# Patient Record
Sex: MALE | Race: WHITE | Employment: FULL TIME | URBAN - METROPOLITAN AREA
[De-identification: names, ages, dates, MRNs, and addresses within clinical notes are randomized per-mention and may not be internally consistent; named-entity substitution may affect disease eponyms.]

---

## 2017-04-07 ENCOUNTER — OFFICE VISIT (OUTPATIENT)
Dept: FAMILY MEDICINE CLINIC | Age: 42
End: 2017-04-07

## 2017-04-07 VITALS
HEART RATE: 76 BPM | DIASTOLIC BLOOD PRESSURE: 80 MMHG | TEMPERATURE: 98.4 F | SYSTOLIC BLOOD PRESSURE: 130 MMHG | BODY MASS INDEX: 37.77 KG/M2 | RESPIRATION RATE: 22 BRPM | OXYGEN SATURATION: 94 % | HEIGHT: 73 IN | WEIGHT: 285 LBS

## 2017-04-07 DIAGNOSIS — M79.671 PAIN IN BOTH FEET: ICD-10-CM

## 2017-04-07 DIAGNOSIS — Z83.3 FAMILY HISTORY OF DIABETES MELLITUS: ICD-10-CM

## 2017-04-07 DIAGNOSIS — M54.40 CHRONIC BILATERAL LOW BACK PAIN WITH SCIATICA, SCIATICA LATERALITY UNSPECIFIED: ICD-10-CM

## 2017-04-07 DIAGNOSIS — Z00.00 ROUTINE GENERAL MEDICAL EXAMINATION AT A HEALTH CARE FACILITY: ICD-10-CM

## 2017-04-07 DIAGNOSIS — R20.0 NUMBNESS IN FEET: ICD-10-CM

## 2017-04-07 DIAGNOSIS — M79.672 PAIN IN BOTH FEET: ICD-10-CM

## 2017-04-07 DIAGNOSIS — M77.30 HEEL SPUR, UNSPECIFIED LATERALITY: ICD-10-CM

## 2017-04-07 DIAGNOSIS — F41.1 GENERALIZED ANXIETY DISORDER: Primary | ICD-10-CM

## 2017-04-07 DIAGNOSIS — N52.9 ERECTILE DYSFUNCTION, UNSPECIFIED ERECTILE DYSFUNCTION TYPE: ICD-10-CM

## 2017-04-07 DIAGNOSIS — G89.29 CHRONIC BILATERAL LOW BACK PAIN WITH SCIATICA, SCIATICA LATERALITY UNSPECIFIED: ICD-10-CM

## 2017-04-07 PROCEDURE — 99213 OFFICE O/P EST LOW 20 MIN: CPT | Performed by: NURSE PRACTITIONER

## 2017-04-07 RX ORDER — SERTRALINE HYDROCHLORIDE 100 MG/1
TABLET, FILM COATED ORAL
Qty: 30 TABLET | Refills: 5 | Status: SHIPPED | OUTPATIENT
Start: 2017-04-07 | End: 2019-01-22 | Stop reason: ALTCHOICE

## 2017-04-07 RX ORDER — SILDENAFIL 100 MG/1
100 TABLET, FILM COATED ORAL PRN
Qty: 9 TABLET | Refills: 3 | Status: SHIPPED | OUTPATIENT
Start: 2017-04-07 | End: 2019-01-22 | Stop reason: ALTCHOICE

## 2017-04-07 RX ORDER — IBUPROFEN AND FAMOTIDINE 26.6; 8 MG/1; MG/1
1 TABLET, FILM COATED ORAL 3 TIMES DAILY
Qty: 90 TABLET | Status: CANCELLED | OUTPATIENT
Start: 2017-04-07

## 2017-04-07 ASSESSMENT — PATIENT HEALTH QUESTIONNAIRE - PHQ9
SUM OF ALL RESPONSES TO PHQ QUESTIONS 1-9: 0
1. LITTLE INTEREST OR PLEASURE IN DOING THINGS: 0
2. FEELING DOWN, DEPRESSED OR HOPELESS: 0
SUM OF ALL RESPONSES TO PHQ9 QUESTIONS 1 & 2: 0

## 2017-11-06 ENCOUNTER — HOSPITAL ENCOUNTER (EMERGENCY)
Age: 42
Discharge: HOME | End: 2017-11-06
Attending: EMERGENCY MEDICINE
Payer: COMMERCIAL

## 2017-11-06 ENCOUNTER — APPOINTMENT (OUTPATIENT)
Dept: GENERAL RADIOLOGY | Age: 42
End: 2017-11-06
Payer: COMMERCIAL

## 2017-11-06 VITALS
TEMPERATURE: 97.7 F | OXYGEN SATURATION: 97 % | HEART RATE: 80 BPM | RESPIRATION RATE: 18 BRPM | BODY MASS INDEX: 39.28 KG/M2 | SYSTOLIC BLOOD PRESSURE: 151 MMHG | WEIGHT: 290 LBS | DIASTOLIC BLOOD PRESSURE: 102 MMHG | HEIGHT: 72 IN

## 2017-11-06 DIAGNOSIS — L08.9 INFECTED PUNCTURE WOUND OF PLANTAR ASPECT OF FOOT, LEFT, INITIAL ENCOUNTER: Primary | ICD-10-CM

## 2017-11-06 DIAGNOSIS — S91.332A INFECTED PUNCTURE WOUND OF PLANTAR ASPECT OF FOOT, LEFT, INITIAL ENCOUNTER: Primary | ICD-10-CM

## 2017-11-06 PROCEDURE — 73630 X-RAY EXAM OF FOOT: CPT

## 2017-11-06 PROCEDURE — 90715 TDAP VACCINE 7 YRS/> IM: CPT | Performed by: EMERGENCY MEDICINE

## 2017-11-06 PROCEDURE — 99283 EMERGENCY DEPT VISIT LOW MDM: CPT

## 2017-11-06 PROCEDURE — 90471 IMMUNIZATION ADMIN: CPT | Performed by: EMERGENCY MEDICINE

## 2017-11-06 PROCEDURE — 6370000000 HC RX 637 (ALT 250 FOR IP): Performed by: EMERGENCY MEDICINE

## 2017-11-06 PROCEDURE — 6360000002 HC RX W HCPCS: Performed by: EMERGENCY MEDICINE

## 2017-11-06 RX ORDER — GINSENG 100 MG
CAPSULE ORAL 3 TIMES DAILY
Status: DISCONTINUED | OUTPATIENT
Start: 2017-11-06 | End: 2017-11-06 | Stop reason: HOSPADM

## 2017-11-06 RX ORDER — TRAMADOL HYDROCHLORIDE 50 MG/1
100 TABLET ORAL ONCE
Status: COMPLETED | OUTPATIENT
Start: 2017-11-06 | End: 2017-11-06

## 2017-11-06 RX ORDER — IBUPROFEN 800 MG/1
800 TABLET ORAL EVERY 8 HOURS PRN
Qty: 20 TABLET | Refills: 0 | Status: SHIPPED | OUTPATIENT
Start: 2017-11-06 | End: 2019-01-22 | Stop reason: ALTCHOICE

## 2017-11-06 RX ORDER — CEPHALEXIN 500 MG/1
500 CAPSULE ORAL 4 TIMES DAILY
Qty: 28 CAPSULE | Refills: 0 | Status: SHIPPED | OUTPATIENT
Start: 2017-11-06 | End: 2017-11-13

## 2017-11-06 RX ADMIN — BACITRACIN: 500 OINTMENT TOPICAL at 14:59

## 2017-11-06 RX ADMIN — TETANUS TOXOID, REDUCED DIPHTHERIA TOXOID AND ACELLULAR PERTUSSIS VACCINE, ADSORBED 0.5 ML: 5; 2.5; 8; 8; 2.5 SUSPENSION INTRAMUSCULAR at 14:11

## 2017-11-06 RX ADMIN — TRAMADOL HYDROCHLORIDE 100 MG: 50 TABLET, COATED ORAL at 14:09

## 2017-11-06 ASSESSMENT — ENCOUNTER SYMPTOMS
SORE THROAT: 0
EYE REDNESS: 0
VOMITING: 0
DIARRHEA: 0
TROUBLE SWALLOWING: 0
VOICE CHANGE: 0
EYE DISCHARGE: 0
EYE PAIN: 0
CHEST TIGHTNESS: 0
FACIAL SWELLING: 0
CONSTIPATION: 0
WHEEZING: 0
CHOKING: 0
SINUS PRESSURE: 0
BLOOD IN STOOL: 0
STRIDOR: 0
BACK PAIN: 0
COUGH: 0
ABDOMINAL PAIN: 0
SHORTNESS OF BREATH: 0

## 2017-11-06 ASSESSMENT — PAIN SCALES - WONG BAKER: WONGBAKER_NUMERICALRESPONSE: 0

## 2017-11-06 ASSESSMENT — PAIN DESCRIPTION - DESCRIPTORS: DESCRIPTORS: SHARP

## 2017-11-06 ASSESSMENT — PAIN SCALES - GENERAL
PAINLEVEL_OUTOF10: 0
PAINLEVEL_OUTOF10: 6
PAINLEVEL_OUTOF10: 5

## 2017-11-06 ASSESSMENT — PAIN DESCRIPTION - PAIN TYPE
TYPE: ACUTE PAIN
TYPE: ACUTE PAIN

## 2017-11-06 ASSESSMENT — PAIN DESCRIPTION - LOCATION: LOCATION: FOOT

## 2017-11-06 ASSESSMENT — PAIN DESCRIPTION - ORIENTATION
ORIENTATION: LEFT
ORIENTATION: LEFT

## 2017-11-06 NOTE — ED PROVIDER NOTES
[address] [place] [address] ED  eMERGENCY dEPARTMENT eNCOUnter      Pt Name: Bonnie Hammond  MRN: [id number]  [place] 1975  Date of evaluation: 11/6/2017  Provider: Radha Morales MD    [address] [place] [address]       Chief Complaint   Patient presents with    Puncture Wound     Lt foot by nail         HISTORY OF PRESENT ILLNESS   (Location/Symptom, Timing/Onset, Context/Setting, Quality, Duration, Modifying Factors, Severity)  Note limiting factors. Bonnie Hammond is a 36 y.o. male who presents to the emergency department Patient states it is a work-related injury was working boot and despite that nail entered over the left fourth and is still intact patient not able to take the shoes off and here for further evaluation as the patient's leg pain status immunizations not sure    HPI    Nursing Notes were reviewed. REVIEW OF SYSTEMS    (2-9 systems for level 4, 10 or more for level 5)     Review of Systems   Constitutional: Negative. Negative for activity change and fever. HENT: Negative for congestion, drooling, facial swelling, mouth sores, nosebleeds, sinus pressure, sore throat, trouble swallowing and voice change. Eyes: Negative for pain, discharge, redness and visual disturbance. Respiratory: Negative for cough, choking, chest tightness, shortness of breath, wheezing and stridor. Cardiovascular: Negative for chest pain, palpitations and leg swelling. Gastrointestinal: Negative for abdominal pain, blood in stool, constipation, diarrhea and vomiting. Endocrine: Negative for cold intolerance, polyphagia and polyuria. Genitourinary: Negative for dysuria, flank pain, frequency, genital sores and urgency. Musculoskeletal: Positive for arthralgias. Negative for back pain, joint swelling, neck pain and neck stiffness. Skin: Negative for pallor and rash. Neurological: Negative for tremors, seizures, syncope, weakness, numbness and headaches. Hematological: Negative for adenopathy.  Does not bruise/bleed easily. Psychiatric/Behavioral: Negative for agitation, behavioral problems, hallucinations and sleep disturbance. The patient is not hyperactive. All other systems reviewed and are negative. Except as noted above the remainder of the review of systems was reviewed and negative. PAST MEDICAL HISTORY     Past Medical History:   Diagnosis Date    Anxiety     Depression     Erectile dysfunction     Generalized anxiety disorder          SURGICAL HISTORY       Past Surgical History:   Procedure Laterality Date    CARPAL TUNNEL RELEASE      WISDOM TOOTH EXTRACTION Bilateral          CURRENT MEDICATIONS       Discharge Medication List as of 11/6/2017  2:55 PM      CONTINUE these medications which have NOT CHANGED    Details   !! sertraline (ZOLOFT) 100 MG tablet TAKE 1 TABLET EVERY DAY, Disp-30 tablet, R-5Normal      !! sertraline (ZOLOFT) 50 MG tablet Take 1 tablet by mouth daily, Disp-30 tablet, R-5In combination with 100 mg to equal 150 mg per dayNormal      sildenafil (VIAGRA) 100 MG tablet Take 1 tablet by mouth as needed for Erectile Dysfunction, Disp-9 tablet, R-3Normal      ibuprofen-famotidine (DUEXIS) 800-26.6 MG TABS Take 1 capsule by mouth 3 times daily, Disp-90 tablet, R-5Normal       !! - Potential duplicate medications found. Please discuss with provider.           ALLERGIES     Vicodin [hydrocodone-acetaminophen]    FAMILY HISTORY       Family History   Problem Relation Age of Onset    Diabetes Other     Heart Disease Father     High Blood Pressure Father     High Cholesterol Father           SOCIAL HISTORY       Social History     Social History    Marital status:      Spouse name: N/A    Number of children: N/A    Years of education: N/A     Social History Main Topics    Smoking status: Never Smoker    Smokeless tobacco: Current User    Alcohol use Yes      Comment: occasional    Drug use: No    Sexual activity: Not Asked     Other Topics Concern    None Social History Narrative    None       SCREENINGS             PHYSICAL EXAM    (up to 7 for level 4, 8 or more for level 5)     ED Triage Vitals [11/06/17 1341]   BP Temp Temp Source Pulse Resp SpO2 Height Weight   (!) 162/96 98.4 °F (36.9 °C) Oral 87 16 97 % 6' (1.829 m) 290 lb (131.5 kg)       Physical Exam   Constitutional: He is oriented to person, place, and time. He appears well-developed and well-nourished. HENT:   Head: Normocephalic and atraumatic. Eyes: Right eye exhibits no discharge. Left eye exhibits no discharge. Neck: Neck supple. No JVD present. No tracheal deviation present. No thyromegaly present. Cardiovascular: Normal rate and normal heart sounds. Exam reveals no gallop. No murmur heard. Pulmonary/Chest: Breath sounds normal. No respiratory distress. He has no wheezes. Abdominal: Soft. Bowel sounds are normal. He exhibits no mass. There is no rebound. Musculoskeletal: Normal range of motion. He exhibits tenderness. He exhibits no edema. Attention given to the left foot patient has a work boot and the nail is intact not sure if and tenderness skin of the foot will do an x-ray   Lymphadenopathy:     He has no cervical adenopathy. Neurological: He is alert and oriented to person, place, and time. No cranial nerve deficit. He exhibits normal muscle tone. Skin: Skin is warm. No rash noted. No erythema.    Psychiatric: His behavior is normal. Thought content normal.       DIAGNOSTIC RESULTS     EKG: All EKG's are interpreted by the Emergency Department Physician who either signs or Co-signs this chart in the absence of a cardiologist.        RADIOLOGY:   Non-plain film images such as CT, Ultrasound and MRI are read by the radiologist. Plain radiographic images are visualized and preliminarily interpreted by the emergency physician with the below findings:        Interpretation per the Radiologist below, if available at the time of this note:    XR FOOT LEFT (MIN 3 VIEWS)

## 2017-11-06 NOTE — ED NOTES
Pts left foot is soaking in hibiclens and saline. Pt is tolerating well. Danyelle Quintero.  Joseph Sosa  11/06/17 4771

## 2017-11-06 NOTE — ED TRIAGE NOTES
Pt reporting he has nail in Lt foot through boot. Happened around 1330. Boots ar muddy and nail not visible at this time.

## 2019-01-22 ENCOUNTER — OFFICE VISIT (OUTPATIENT)
Dept: FAMILY MEDICINE CLINIC | Age: 44
End: 2019-01-22
Payer: COMMERCIAL

## 2019-01-22 VITALS
TEMPERATURE: 97.7 F | HEIGHT: 72 IN | SYSTOLIC BLOOD PRESSURE: 130 MMHG | DIASTOLIC BLOOD PRESSURE: 84 MMHG | WEIGHT: 308 LBS | OXYGEN SATURATION: 98 % | HEART RATE: 86 BPM | RESPIRATION RATE: 16 BRPM | BODY MASS INDEX: 41.72 KG/M2

## 2019-01-22 DIAGNOSIS — N52.9 ERECTILE DYSFUNCTION, UNSPECIFIED ERECTILE DYSFUNCTION TYPE: ICD-10-CM

## 2019-01-22 DIAGNOSIS — F33.1 MODERATE EPISODE OF RECURRENT MAJOR DEPRESSIVE DISORDER (HCC): ICD-10-CM

## 2019-01-22 DIAGNOSIS — F41.1 GENERALIZED ANXIETY DISORDER: Primary | ICD-10-CM

## 2019-01-22 DIAGNOSIS — Z00.00 ROUTINE GENERAL MEDICAL EXAMINATION AT A HEALTH CARE FACILITY: ICD-10-CM

## 2019-01-22 PROCEDURE — G8484 FLU IMMUNIZE NO ADMIN: HCPCS | Performed by: NURSE PRACTITIONER

## 2019-01-22 PROCEDURE — 99213 OFFICE O/P EST LOW 20 MIN: CPT | Performed by: NURSE PRACTITIONER

## 2019-01-22 PROCEDURE — G8417 CALC BMI ABV UP PARAM F/U: HCPCS | Performed by: NURSE PRACTITIONER

## 2019-01-22 PROCEDURE — G8427 DOCREV CUR MEDS BY ELIG CLIN: HCPCS | Performed by: NURSE PRACTITIONER

## 2019-01-22 PROCEDURE — 4004F PT TOBACCO SCREEN RCVD TLK: CPT | Performed by: NURSE PRACTITIONER

## 2019-01-22 RX ORDER — SILDENAFIL 100 MG/1
100 TABLET, FILM COATED ORAL PRN
Qty: 30 TABLET | Refills: 1 | Status: SHIPPED | OUTPATIENT
Start: 2019-01-22

## 2019-01-22 RX ORDER — ESCITALOPRAM OXALATE 10 MG/1
10 TABLET ORAL DAILY
Qty: 30 TABLET | Refills: 1 | Status: SHIPPED | OUTPATIENT
Start: 2019-01-22 | End: 2019-02-04 | Stop reason: SDUPTHER

## 2019-01-22 RX ORDER — BUSPIRONE HYDROCHLORIDE 10 MG/1
10 TABLET ORAL 2 TIMES DAILY
Qty: 60 TABLET | Refills: 0 | Status: SHIPPED | OUTPATIENT
Start: 2019-01-22 | End: 2019-02-04 | Stop reason: SDUPTHER

## 2019-01-22 ASSESSMENT — PATIENT HEALTH QUESTIONNAIRE - PHQ9
1. LITTLE INTEREST OR PLEASURE IN DOING THINGS: 1
SUM OF ALL RESPONSES TO PHQ9 QUESTIONS 1 & 2: 2
SUM OF ALL RESPONSES TO PHQ QUESTIONS 1-9: 2
2. FEELING DOWN, DEPRESSED OR HOPELESS: 1
SUM OF ALL RESPONSES TO PHQ QUESTIONS 1-9: 2

## 2019-01-31 ENCOUNTER — TELEPHONE (OUTPATIENT)
Dept: FAMILY MEDICINE CLINIC | Age: 44
End: 2019-01-31

## 2019-01-31 ENCOUNTER — HOSPITAL ENCOUNTER (OUTPATIENT)
Dept: LAB | Age: 44
Discharge: HOME | End: 2019-01-31
Payer: COMMERCIAL

## 2019-01-31 DIAGNOSIS — F41.1 GENERALIZED ANXIETY DISORDER: ICD-10-CM

## 2019-01-31 DIAGNOSIS — Z00.00 ROUTINE GENERAL MEDICAL EXAMINATION AT A HEALTH CARE FACILITY: ICD-10-CM

## 2019-01-31 DIAGNOSIS — N52.9 ERECTILE DYSFUNCTION, UNSPECIFIED ERECTILE DYSFUNCTION TYPE: ICD-10-CM

## 2019-01-31 LAB
ALBUMIN SERPL-MCNC: 4.2 G/DL (ref 3.9–4.9)
ALP BLD-CCNC: 107 U/L (ref 35–104)
ALT SERPL-CCNC: 50 U/L (ref 0–41)
ANION GAP SERPL CALCULATED.3IONS-SCNC: 11 MEQ/L (ref 7–13)
AST SERPL-CCNC: 36 U/L (ref 0–40)
BASOPHILS ABSOLUTE: 0.1 K/UL (ref 0–0.2)
BASOPHILS RELATIVE PERCENT: 1 %
BILIRUB SERPL-MCNC: <0.2 MG/DL (ref 0–1.2)
BUN BLDV-MCNC: 11 MG/DL (ref 6–20)
CALCIUM SERPL-MCNC: 9.1 MG/DL (ref 8.6–10.2)
CHLORIDE BLD-SCNC: 102 MEQ/L (ref 98–107)
CHOLESTEROL, TOTAL: 167 MG/DL (ref 0–199)
CO2: 27 MEQ/L (ref 22–29)
CREAT SERPL-MCNC: 1.18 MG/DL (ref 0.7–1.2)
EOSINOPHILS ABSOLUTE: 0.1 K/UL (ref 0–0.7)
EOSINOPHILS RELATIVE PERCENT: 1.6 %
GFR AFRICAN AMERICAN: >60
GFR NON-AFRICAN AMERICAN: >60
GLOBULIN: 3.3 G/DL (ref 2.3–3.5)
GLUCOSE BLD-MCNC: 87 MG/DL (ref 74–109)
HCT VFR BLD CALC: 46 % (ref 42–52)
HDLC SERPL-MCNC: 34 MG/DL (ref 40–59)
HEMOGLOBIN: 16 G/DL (ref 14–18)
LDL CHOLESTEROL CALCULATED: 88 MG/DL (ref 0–129)
LYMPHOCYTES ABSOLUTE: 2.5 K/UL (ref 1–4.8)
LYMPHOCYTES RELATIVE PERCENT: 27.3 %
MCH RBC QN AUTO: 29.3 PG (ref 27–31.3)
MCHC RBC AUTO-ENTMCNC: 34.8 % (ref 33–37)
MCV RBC AUTO: 84.1 FL (ref 80–100)
MONOCYTES ABSOLUTE: 0.8 K/UL (ref 0.2–0.8)
MONOCYTES RELATIVE PERCENT: 8.3 %
NEUTROPHILS ABSOLUTE: 5.6 K/UL (ref 1.4–6.5)
NEUTROPHILS RELATIVE PERCENT: 61.8 %
PDW BLD-RTO: 13.9 % (ref 11.5–14.5)
PLATELET # BLD: 309 K/UL (ref 130–400)
POTASSIUM SERPL-SCNC: 4.3 MEQ/L (ref 3.5–5.1)
PROSTATE SPECIFIC ANTIGEN: 0.88 NG/ML
RBC # BLD: 5.47 M/UL (ref 4.7–6.1)
SODIUM BLD-SCNC: 140 MEQ/L (ref 132–144)
T4 FREE: 1.28 NG/DL (ref 0.93–1.7)
TOTAL PROTEIN: 7.5 G/DL (ref 6.4–8.1)
TRIGL SERPL-MCNC: 227 MG/DL (ref 0–200)
TSH SERPL DL<=0.05 MIU/L-ACNC: 1.85 UIU/ML (ref 0.27–4.2)
VITAMIN B-12: 308 PG/ML (ref 232–1245)
WBC # BLD: 9.1 K/UL (ref 4.8–10.8)

## 2019-01-31 PROCEDURE — 80053 COMPREHEN METABOLIC PANEL: CPT

## 2019-01-31 PROCEDURE — 84153 ASSAY OF PSA TOTAL: CPT

## 2019-01-31 PROCEDURE — 36415 COLL VENOUS BLD VENIPUNCTURE: CPT

## 2019-01-31 PROCEDURE — 84439 ASSAY OF FREE THYROXINE: CPT

## 2019-01-31 PROCEDURE — 84443 ASSAY THYROID STIM HORMONE: CPT

## 2019-01-31 PROCEDURE — 84270 ASSAY OF SEX HORMONE GLOBUL: CPT

## 2019-01-31 PROCEDURE — 84403 ASSAY OF TOTAL TESTOSTERONE: CPT

## 2019-01-31 PROCEDURE — 82607 VITAMIN B-12: CPT

## 2019-01-31 PROCEDURE — 80061 LIPID PANEL: CPT

## 2019-01-31 PROCEDURE — 85025 COMPLETE CBC W/AUTO DIFF WBC: CPT

## 2019-02-02 LAB
SEX HORMONE BINDING GLOBULIN: 26 NMOL/L (ref 11–80)
TESTOSTERONE FREE PERCENT: 2 % (ref 1.6–2.9)
TESTOSTERONE FREE, CALC: 58 PG/ML (ref 47–244)
TESTOSTERONE TOTAL-MALE: 287 NG/DL (ref 300–890)

## 2019-02-04 ENCOUNTER — OFFICE VISIT (OUTPATIENT)
Dept: FAMILY MEDICINE CLINIC | Age: 44
End: 2019-02-04

## 2019-02-04 VITALS
DIASTOLIC BLOOD PRESSURE: 98 MMHG | HEIGHT: 72 IN | TEMPERATURE: 97.8 F | OXYGEN SATURATION: 97 % | BODY MASS INDEX: 41.45 KG/M2 | WEIGHT: 306 LBS | SYSTOLIC BLOOD PRESSURE: 138 MMHG | HEART RATE: 81 BPM | RESPIRATION RATE: 14 BRPM

## 2019-02-04 DIAGNOSIS — E78.1 HYPERTRIGLYCERIDEMIA: ICD-10-CM

## 2019-02-04 DIAGNOSIS — F41.1 GENERALIZED ANXIETY DISORDER: ICD-10-CM

## 2019-02-04 DIAGNOSIS — N52.9 ERECTILE DYSFUNCTION, UNSPECIFIED ERECTILE DYSFUNCTION TYPE: ICD-10-CM

## 2019-02-04 DIAGNOSIS — R79.89 LOW TESTOSTERONE IN MALE: ICD-10-CM

## 2019-02-04 DIAGNOSIS — F33.1 MODERATE EPISODE OF RECURRENT MAJOR DEPRESSIVE DISORDER (HCC): Primary | ICD-10-CM

## 2019-02-04 PROCEDURE — G8417 CALC BMI ABV UP PARAM F/U: HCPCS | Performed by: NURSE PRACTITIONER

## 2019-02-04 PROCEDURE — 99214 OFFICE O/P EST MOD 30 MIN: CPT | Performed by: NURSE PRACTITIONER

## 2019-02-04 PROCEDURE — G8427 DOCREV CUR MEDS BY ELIG CLIN: HCPCS | Performed by: NURSE PRACTITIONER

## 2019-02-04 PROCEDURE — G8484 FLU IMMUNIZE NO ADMIN: HCPCS | Performed by: NURSE PRACTITIONER

## 2019-02-04 PROCEDURE — 4004F PT TOBACCO SCREEN RCVD TLK: CPT | Performed by: NURSE PRACTITIONER

## 2019-02-04 RX ORDER — BUSPIRONE HYDROCHLORIDE 10 MG/1
10 TABLET ORAL 2 TIMES DAILY
Qty: 60 TABLET | Refills: 2 | Status: SHIPPED | OUTPATIENT
Start: 2019-02-04 | End: 2019-03-06

## 2019-02-04 RX ORDER — ESCITALOPRAM OXALATE 10 MG/1
10 TABLET ORAL DAILY
Qty: 30 TABLET | Refills: 2 | Status: SHIPPED | OUTPATIENT
Start: 2019-02-04 | End: 2021-04-29 | Stop reason: ALTCHOICE

## 2019-02-04 ASSESSMENT — PATIENT HEALTH QUESTIONNAIRE - PHQ9
SUM OF ALL RESPONSES TO PHQ9 QUESTIONS 1 & 2: 0
1. LITTLE INTEREST OR PLEASURE IN DOING THINGS: 0
2. FEELING DOWN, DEPRESSED OR HOPELESS: 0
SUM OF ALL RESPONSES TO PHQ QUESTIONS 1-9: 0
SUM OF ALL RESPONSES TO PHQ QUESTIONS 1-9: 0

## 2019-02-12 ENCOUNTER — OFFICE VISIT (OUTPATIENT)
Dept: ENDOCRINOLOGY | Age: 44
End: 2019-02-12
Payer: COMMERCIAL

## 2019-02-12 VITALS
SYSTOLIC BLOOD PRESSURE: 158 MMHG | DIASTOLIC BLOOD PRESSURE: 82 MMHG | WEIGHT: 308 LBS | BODY MASS INDEX: 41.72 KG/M2 | HEART RATE: 82 BPM | HEIGHT: 72 IN

## 2019-02-12 DIAGNOSIS — F32.9 REACTIVE DEPRESSION: ICD-10-CM

## 2019-02-12 DIAGNOSIS — E29.1 HYPOGONADISM IN MALE: Primary | ICD-10-CM

## 2019-02-12 DIAGNOSIS — N52.8 OTHER MALE ERECTILE DYSFUNCTION: ICD-10-CM

## 2019-02-12 PROCEDURE — G8417 CALC BMI ABV UP PARAM F/U: HCPCS | Performed by: PHYSICIAN ASSISTANT

## 2019-02-12 PROCEDURE — G8427 DOCREV CUR MEDS BY ELIG CLIN: HCPCS | Performed by: PHYSICIAN ASSISTANT

## 2019-02-12 PROCEDURE — G8484 FLU IMMUNIZE NO ADMIN: HCPCS | Performed by: PHYSICIAN ASSISTANT

## 2019-02-12 PROCEDURE — 99243 OFF/OP CNSLTJ NEW/EST LOW 30: CPT | Performed by: PHYSICIAN ASSISTANT

## 2019-02-12 RX ORDER — TESTOSTERONE 16.2 MG/G
2 GEL TRANSDERMAL DAILY
Qty: 1 BOTTLE | Refills: 3 | Status: SHIPPED | OUTPATIENT
Start: 2019-02-12 | End: 2019-03-26

## 2019-02-12 ASSESSMENT — ENCOUNTER SYMPTOMS
COUGH: 0
VOMITING: 0
EYE REDNESS: 0
SORE THROAT: 0
NAUSEA: 0
WHEEZING: 0
RHINORRHEA: 0
EYE PAIN: 0
ABDOMINAL PAIN: 0
DIARRHEA: 0
SINUS PRESSURE: 0
SHORTNESS OF BREATH: 0

## 2019-02-15 ENCOUNTER — TELEPHONE (OUTPATIENT)
Dept: ENDOCRINOLOGY | Age: 44
End: 2019-02-15

## 2019-02-15 DIAGNOSIS — E29.1 HYPOGONADISM IN MALE: Primary | ICD-10-CM

## 2019-02-15 RX ORDER — TESTOSTERONE GEL, 1% 10 MG/G
GEL TRANSDERMAL
Qty: 30 TUBE | Refills: 3 | Status: SHIPPED | OUTPATIENT
Start: 2019-02-15 | End: 2019-02-20 | Stop reason: SDUPTHER

## 2019-02-20 DIAGNOSIS — E29.1 HYPOGONADISM IN MALE: ICD-10-CM

## 2019-02-20 RX ORDER — TESTOSTERONE 50 MG/5G
GEL TRANSDERMAL
Qty: 90 TUBE | Refills: 1 | Status: SHIPPED | OUTPATIENT
Start: 2019-02-20 | End: 2019-03-26 | Stop reason: SDUPTHER

## 2019-03-26 ENCOUNTER — OFFICE VISIT (OUTPATIENT)
Dept: ENDOCRINOLOGY | Age: 44
End: 2019-03-26
Payer: COMMERCIAL

## 2019-03-26 ENCOUNTER — HOSPITAL ENCOUNTER (OUTPATIENT)
Dept: LAB | Age: 44
Discharge: HOME | End: 2019-03-26
Payer: COMMERCIAL

## 2019-03-26 VITALS
SYSTOLIC BLOOD PRESSURE: 138 MMHG | DIASTOLIC BLOOD PRESSURE: 86 MMHG | HEART RATE: 83 BPM | HEIGHT: 72 IN | BODY MASS INDEX: 42.66 KG/M2 | WEIGHT: 315 LBS

## 2019-03-26 DIAGNOSIS — E29.1 HYPOGONADISM IN MALE: ICD-10-CM

## 2019-03-26 LAB
HCT VFR BLD CALC: 47.4 % (ref 42–52)
HEMOGLOBIN: 15.4 G/DL (ref 14–18)
MCH RBC QN AUTO: 28.4 PG (ref 27–31.3)
MCHC RBC AUTO-ENTMCNC: 32.6 % (ref 33–37)
MCV RBC AUTO: 87.1 FL (ref 80–100)
PDW BLD-RTO: 14.5 % (ref 11.5–14.5)
PLATELET # BLD: 271 K/UL (ref 130–400)
RBC # BLD: 5.44 M/UL (ref 4.7–6.1)
WBC # BLD: 5.7 K/UL (ref 4.8–10.8)

## 2019-03-26 PROCEDURE — 4004F PT TOBACCO SCREEN RCVD TLK: CPT | Performed by: PHYSICIAN ASSISTANT

## 2019-03-26 PROCEDURE — G8417 CALC BMI ABV UP PARAM F/U: HCPCS | Performed by: PHYSICIAN ASSISTANT

## 2019-03-26 PROCEDURE — 36415 COLL VENOUS BLD VENIPUNCTURE: CPT

## 2019-03-26 PROCEDURE — 84270 ASSAY OF SEX HORMONE GLOBUL: CPT

## 2019-03-26 PROCEDURE — 99214 OFFICE O/P EST MOD 30 MIN: CPT | Performed by: PHYSICIAN ASSISTANT

## 2019-03-26 PROCEDURE — G8427 DOCREV CUR MEDS BY ELIG CLIN: HCPCS | Performed by: PHYSICIAN ASSISTANT

## 2019-03-26 PROCEDURE — 85027 COMPLETE CBC AUTOMATED: CPT

## 2019-03-26 PROCEDURE — G8484 FLU IMMUNIZE NO ADMIN: HCPCS | Performed by: PHYSICIAN ASSISTANT

## 2019-03-26 PROCEDURE — 84403 ASSAY OF TOTAL TESTOSTERONE: CPT

## 2019-03-26 RX ORDER — TESTOSTERONE 50 MG/5G
GEL TRANSDERMAL
Qty: 90 TUBE | Refills: 1 | Status: SHIPPED | OUTPATIENT
Start: 2019-03-26 | End: 2019-05-23 | Stop reason: SDUPTHER

## 2019-03-26 ASSESSMENT — ENCOUNTER SYMPTOMS
EYE PAIN: 0
RHINORRHEA: 0
EYE REDNESS: 0
SHORTNESS OF BREATH: 0
ABDOMINAL PAIN: 0
NAUSEA: 0
VOMITING: 0
SORE THROAT: 0
SINUS PRESSURE: 0
WHEEZING: 0
COUGH: 0
DIARRHEA: 0

## 2019-03-29 LAB
SEX HORMONE BINDING GLOBULIN: 31 NMOL/L (ref 11–80)
TESTOSTERONE FREE PERCENT: 1.8 % (ref 1.6–2.9)
TESTOSTERONE FREE, CALC: 24 PG/ML (ref 47–244)
TESTOSTERONE TOTAL-MALE: 138 NG/DL (ref 300–890)

## 2019-05-23 ENCOUNTER — OFFICE VISIT (OUTPATIENT)
Dept: ENDOCRINOLOGY | Age: 44
End: 2019-05-23
Payer: COMMERCIAL

## 2019-05-23 VITALS
WEIGHT: 310 LBS | HEART RATE: 78 BPM | HEIGHT: 72 IN | BODY MASS INDEX: 41.99 KG/M2 | DIASTOLIC BLOOD PRESSURE: 87 MMHG | SYSTOLIC BLOOD PRESSURE: 131 MMHG

## 2019-05-23 DIAGNOSIS — E29.1 HYPOGONADISM IN MALE: Primary | ICD-10-CM

## 2019-05-23 PROCEDURE — 99214 OFFICE O/P EST MOD 30 MIN: CPT | Performed by: PHYSICIAN ASSISTANT

## 2019-05-23 PROCEDURE — G8417 CALC BMI ABV UP PARAM F/U: HCPCS | Performed by: PHYSICIAN ASSISTANT

## 2019-05-23 PROCEDURE — 4004F PT TOBACCO SCREEN RCVD TLK: CPT | Performed by: PHYSICIAN ASSISTANT

## 2019-05-23 PROCEDURE — G8427 DOCREV CUR MEDS BY ELIG CLIN: HCPCS | Performed by: PHYSICIAN ASSISTANT

## 2019-05-23 RX ORDER — TESTOSTERONE 50 MG/5G
GEL TRANSDERMAL
Qty: 180 TUBE | Refills: 1 | Status: SHIPPED | OUTPATIENT
Start: 2019-05-23 | End: 2022-01-31 | Stop reason: ALTCHOICE

## 2019-05-23 ASSESSMENT — ENCOUNTER SYMPTOMS
SHORTNESS OF BREATH: 0
NAUSEA: 0
SINUS PRESSURE: 0
DIARRHEA: 0
EYE REDNESS: 0
RHINORRHEA: 0
VOMITING: 0
SORE THROAT: 0
COUGH: 0
WHEEZING: 0
EYE PAIN: 0
ABDOMINAL PAIN: 0

## 2019-05-23 NOTE — PROGRESS NOTES
dealing with recent emotional stressors due to a breakup and is currently prescribed antidepressant medications. He denies a history of head trauma, genitalia or pelvic trauma or surgeries, steroid use, dysuria, or STDs. He is a physically active man, works 10-12 hours a day however states that he sleeps a lot when he gets home due to severe fatigue. His total testosterone level was low at 287. Libido has improved and his energy has improved. Continue with current treatment plan. Past Medical History:   Diagnosis Date    Anxiety     Depression     Erectile dysfunction     Generalized anxiety disorder     Hypertriglyceridemia 2/4/2019       Current Outpatient Medications:     TESTIM 50 MG/5GM (1%) GEL 1% gel, Apply 2 packet daily, Disp: 180 Tube, Rfl: 1    escitalopram (LEXAPRO) 10 MG tablet, Take 1 tablet by mouth daily, Disp: 30 tablet, Rfl: 2    sildenafil (VIAGRA) 100 MG tablet, Take 1 tablet by mouth as needed for Erectile Dysfunction, Disp: 30 tablet, Rfl: 1        Lab Results   Component Value Date     01/31/2019    K 4.3 01/31/2019     01/31/2019    CO2 27 01/31/2019    BUN 11 01/31/2019    CREATININE 1.18 01/31/2019    GLUCOSE 87 01/31/2019    CALCIUM 9.1 01/31/2019    PROT 7.5 01/31/2019    LABALBU 4.2 01/31/2019    BILITOT <0.2 01/31/2019    ALKPHOS 107 (H) 01/31/2019    AST 36 01/31/2019    ALT 50 (H) 01/31/2019    LABGLOM >60.0 01/31/2019    GFRAA >60.0 01/31/2019    GLOB 3.3 01/31/2019     Lab Results   Component Value Date    WBC 5.7 03/26/2019    HGB 15.4 03/26/2019    HCT 47.4 03/26/2019    MCV 87.1 03/26/2019     03/26/2019     Results for Calos Dee (MRN [id number]) as of 2/12/2019 10:08   Ref.  Range 3/17/2016 11:48 1/31/2019 15:35   Total Testosterone Latest Ref Range: 300 - 890 ng/dL 195 (L) 287 (L)     No results found for: LABA1C  Lab Results   Component Value Date    CHOL 167 01/31/2019     Lab Results   Component Value Date    TRIG 227 (H) 01/31/2019 Lab Results   Component Value Date    HDL 34 (L) 01/31/2019     Lab Results   Component Value Date    LDLCALC 88 01/31/2019     No results found for: LABVLDL, VLDL  No results found for: CHOLHDLRATIO  No results found for: TESTOSTERONE  Lab Results   Component Value Date    TSH 1.850 01/31/2019           Allergies   Allergen Reactions    Vicodin [Hydrocodone-Acetaminophen] Nausea Only     sick       Past Surgical History:   Procedure Laterality Date    CARPAL TUNNEL RELEASE      WISDOM TOOTH EXTRACTION Bilateral      Social History     Socioeconomic History    Marital status:      Spouse name: Not on file    Number of children: Not on file    Years of education: Not on file    Highest education level: Not on file   Occupational History    Not on file   Social Needs    Financial resource strain: Not on file    Food insecurity:     Worry: Not on file     Inability: Not on file    Transportation needs:     Medical: Not on file     Non-medical: Not on file   Tobacco Use    Smoking status: Never Smoker    Smokeless tobacco: Current User   Substance and Sexual Activity    Alcohol use: Yes     Comment: occasional    Drug use: No    Sexual activity: Not on file   Lifestyle    Physical activity:     Days per week: Not on file     Minutes per session: Not on file    Stress: Not on file   Relationships    Social connections:     Talks on phone: Not on file     Gets together: Not on file     Attends Hinduism service: Not on file     Active member of club or organization: Not on file     Attends meetings of clubs or organizations: Not on file     Relationship status: Not on file    Intimate partner violence:     Fear of current or ex partner: Not on file     Emotionally abused: Not on file     Physically abused: Not on file     Forced sexual activity: Not on file   Other Topics Concern    Not on file   Social History Narrative    Not on file     Family History   Problem Relation Age of Onset  Diabetes Other     Heart Disease Father     High Blood Pressure Father     High Cholesterol Father          Review of Systems   Constitutional: Negative for chills, fatigue and fever. HENT: Negative for congestion, ear pain, postnasal drip, rhinorrhea, sinus pressure and sore throat. Eyes: Negative for pain and redness. Respiratory: Negative for cough, shortness of breath and wheezing. Cardiovascular: Negative for chest pain, palpitations and leg swelling. Gastrointestinal: Negative for abdominal pain, diarrhea, nausea and vomiting. Endocrine: Negative for cold intolerance, heat intolerance and polyuria. Genitourinary: Negative for decreased urine volume, difficulty urinating, discharge, dysuria, flank pain, genital sores, hematuria, penile pain, penile swelling, scrotal swelling, testicular pain and urgency. Musculoskeletal: Negative for arthralgias. Skin: Negative for rash. Allergic/Immunologic: Negative for environmental allergies. Neurological: Negative for dizziness and headaches. Hematological: Negative for adenopathy. Psychiatric/Behavioral: Negative for agitation and confusion. The patient is nervous/anxious. Dysphoric mood        Objective:   Physical Exam   Constitutional: He is oriented to person, place, and time. He appears well-developed and well-nourished. HENT:   Head: Normocephalic and atraumatic. Eyes: Conjunctivae and EOM are normal.   Neck: Normal range of motion. Neck supple. No thyromegaly present. Cardiovascular: Normal rate, regular rhythm and normal heart sounds. Pulmonary/Chest: Effort normal and breath sounds normal.   Abdominal: Soft. Bowel sounds are normal. He exhibits no distension. There is no tenderness. A hernia is present. Hernia confirmed positive in the left inguinal area. Hernia confirmed negative in the right inguinal area. Genitourinary: Penis normal. Right testis shows no mass, no swelling and no tenderness.  Right testis is descended. Cremasteric reflex is absent on the right side. Left testis shows no mass, no swelling and no tenderness. Left testis is descended. Cremasteric reflex is not absent on the left side. Circumcised. No penile erythema or penile tenderness. No discharge found. Musculoskeletal: Normal range of motion. Lymphadenopathy:     He has no cervical adenopathy. No inguinal adenopathy noted on the right or left side. Neurological: He is alert and oriented to person, place, and time. Skin: Skin is warm and dry. Vitals reviewed.

## 2021-03-29 ENCOUNTER — OFFICE VISIT (OUTPATIENT)
Dept: FAMILY MEDICINE CLINIC | Age: 46
End: 2021-03-29
Payer: COMMERCIAL

## 2021-03-29 ENCOUNTER — NURSE TRIAGE (OUTPATIENT)
Dept: OTHER | Facility: CLINIC | Age: 46
End: 2021-03-29

## 2021-03-29 VITALS
HEIGHT: 72 IN | WEIGHT: 315 LBS | BODY MASS INDEX: 42.66 KG/M2 | SYSTOLIC BLOOD PRESSURE: 122 MMHG | RESPIRATION RATE: 17 BRPM | HEART RATE: 78 BPM | TEMPERATURE: 97.8 F | OXYGEN SATURATION: 97 % | DIASTOLIC BLOOD PRESSURE: 80 MMHG

## 2021-03-29 DIAGNOSIS — G25.81 RESTLESS LEGS: ICD-10-CM

## 2021-03-29 DIAGNOSIS — R60.9 DEPENDENT EDEMA: Primary | ICD-10-CM

## 2021-03-29 PROCEDURE — 99213 OFFICE O/P EST LOW 20 MIN: CPT | Performed by: NURSE PRACTITIONER

## 2021-03-29 SDOH — ECONOMIC STABILITY: TRANSPORTATION INSECURITY
IN THE PAST 12 MONTHS, HAS LACK OF TRANSPORTATION KEPT YOU FROM MEETINGS, WORK, OR FROM GETTING THINGS NEEDED FOR DAILY LIVING?: NO

## 2021-03-29 SDOH — ECONOMIC STABILITY: FOOD INSECURITY: WITHIN THE PAST 12 MONTHS, THE FOOD YOU BOUGHT JUST DIDN'T LAST AND YOU DIDN'T HAVE MONEY TO GET MORE.: NEVER TRUE

## 2021-03-29 ASSESSMENT — ENCOUNTER SYMPTOMS
COUGH: 0
DIARRHEA: 0
VOMITING: 0
ABDOMINAL PAIN: 0
NAUSEA: 0

## 2021-03-29 NOTE — PATIENT INSTRUCTIONS
medicine. · Whenever you are resting, raise your legs up. Try to keep the swollen area higher than the level of your heart. · Take breaks from standing or sitting in one position. ? Walk around to increase the blood flow in your lower legs. ? Move your feet and ankles often while you stand, or tighten and relax your leg muscles. · Wear support stockings. Put them on in the morning, before swelling gets worse. · Eat a balanced diet. Lose weight if you need to. · Limit the amount of salt (sodium) in your diet. Salt holds fluid in the body and may increase swelling. When should you call for help? Call 911 anytime you think you may need emergency care. For example, call if:    · You have symptoms of a blood clot in your lung (called a pulmonary embolism). These may include:  ? Sudden chest pain. ? Trouble breathing. ? Coughing up blood. Call your doctor now or seek immediate medical care if:    · You have signs of a blood clot, such as:  ? Pain in your calf, back of the knee, thigh, or groin. ? Redness and swelling in your leg or groin.     · You have symptoms of infection, such as:  ? Increased pain, swelling, warmth, or redness. ? Red streaks or pus. ? A fever. Watch closely for changes in your health, and be sure to contact your doctor if:    · Your swelling is getting worse.     · You have new or worsening pain in your legs.     · You do not get better as expected. Where can you learn more? Go to https://[email or web address]. org and sign in to your [employer] account. Enter H347 in the [facility] box to learn more about \"Leg and Ankle Edema: Care Instructions. \"     If you do not have an account, please click on the \"Sign Up Now\" link. Current as of: February 26, 2020               Content Version: 12.8  © 0752-9394 [employer]wise, Incorporated. Care instructions adapted under license by [facility] [place] ([facility]).  If you have questions about a medical condition or this instruction, always ask your healthcare professional. [place] [address] any warranty or liability for your use of this information. Patient Education   Learning About Using Compression Stockings  What are compression stockings? Compression stockings may be used for problems like varicose veins, skin ulcers, and deep vein thrombosis. But there are different types of stockings, and they need to fit right. So your doctor will recommend what you need. These stockings are the most common treatment for varicose veins. They help the blood circulate in your legs. This can prevent skin ulcers and keep blood from building up in the legs. Prescription stockings are tightest at the foot. They get less and less tight farther up on your legs. The kind you buy without a prescription have lighter elastic. So the pressure is even all the way up the leg. These don't cost as much. But they don't provide the compression you need to treat serious symptoms or prevent skin ulcers. How do you use compression stockings? · If your stockings are new, you may want to wash them before you put them on. This can make them more flexible and easier to put on. It's a good idea to wash them by hand. · Most of the time it's best to put on your stockings early in the morning. This is when you have the least swelling in your legs. · Put silicone lotion (such as ALPS) or talcum powder on your legs to help the stockings slide on. If your stockings contain latex, or you aren't sure if they contain latex, do not use other types of lotions or creams on your legs when you wear the stockings. You may use other lotions or creams when you are not wearing the stockings. · Sit in a chair with a back while you put on the stockings. This gives you something to lean against as you pull them up. · How to put on stockings:  ? Turn your stocking inside out.  Then put your toe in as far as it will go.  ? Readjust the stocking by folding it back onto itself at

## 2021-03-29 NOTE — TELEPHONE ENCOUNTER
Received call from Daiana Sweet at [facility] [employer] Complaint. Brief description of triage: bilateral lower leg swelling, started three years ago    Triage indicates for patient to be seen today in office. Care advice provided, patient verbalizes understanding; denies any other questions or concerns; instructed to call back for any new or worsening symptoms. Writer provided warm transfer to [facility] for appointment scheduling. Attention Provider: Thank you for allowing me to participate in the care of your patient. The patient was connected to triage in response to information provided to the [facility]. Please do not respond through this encounter as the response is not directed to a shared pool. Reason for Disposition   MODERATE swelling of both ankles (e.g., swelling extends up to the knees) AND new onset or worsening    Answer Assessment - Initial Assessment Questions  1. ONSET: \"When did the swelling start? \" (e.g., minutes, hours, days)      Three years ago    2. LOCATION: \"What part of the leg is swollen? \"  \"Are both legs swollen or just one leg? \"      Bilateral lower leg    3. SEVERITY: \"How bad is the swelling? \" (e.g., localized; mild, moderate, severe)   - Localized - small area of swelling localized to one leg   - MILD pedal edema - swelling limited to foot and ankle, pitting edema < 1/4 inch (6 mm) deep, rest and elevation eliminate most or all swelling   - MODERATE edema - swelling of lower leg to knee, pitting edema > 1/4 inch (6 mm) deep, rest and elevation only partially reduce swelling   - SEVERE edema - swelling extends above knee, facial or hand swelling present       Mild    4. REDNESS: \"Does the swelling look red or infected? \"      No redness but reports \"bruising\" to inside of ankles    5. PAIN: \"Is the swelling painful to touch? \" If so, ask: \"How painful is it? \"   (Scale 1-10; mild, moderate or severe)      No pain, but wakes up at night with throbbing and

## 2021-03-29 NOTE — PROGRESS NOTES
expected course around dependent edema in the context of prolonged sitting/ sedentary lifestyle and we discussed conservative supportive care measures that he could try. Diagnoses and all orders for this visit:    ICD-10-CM    1. Dependent edema  R60.9 CBC With Auto Differential     Comprehensive Metabolic Panel     Hemoglobin A1C   2. Restless legs  G25.81      No orders of the defined types were placed in this encounter. 1. Try compression stockings 15 mmHg 3d/week to begin  2. Moderate exercise- avoid exercising late in the day with respect to restless leg sxs  OTC analgesics prn  3. Cut back on alcohol, caffeine if applicable  4. Apply warm or cool packs to your legs, which can help relieve sensations  5. Try to go to bed at the same time every night and wake up at the same time every morning    Return for follow-up with PCP next month as planned- return to walk-in as needed. Side effects and adverse effects of any medication prescribed today, as well as treatment plan/rationale, follow-up care, and result expectations have been discussed with the patient. Temo Flores expresses understanding and wishes to proceed with the plan. Discussed signs and symptoms which require immediate follow-up in ED/call to 911. Understanding verbalized. I have reviewed and updated the electronic medical record.     Karthik Crisostomo, APRN - CNP

## 2021-04-02 ENCOUNTER — HOSPITAL ENCOUNTER (OUTPATIENT)
Dept: LAB | Age: 46
Discharge: HOME | End: 2021-04-02
Payer: COMMERCIAL

## 2021-04-02 DIAGNOSIS — R60.9 DEPENDENT EDEMA: ICD-10-CM

## 2021-04-02 LAB
ALBUMIN SERPL-MCNC: 4.4 G/DL (ref 3.5–4.6)
ALP BLD-CCNC: 97 U/L (ref 35–104)
ALT SERPL-CCNC: 53 U/L (ref 0–41)
ANION GAP SERPL CALCULATED.3IONS-SCNC: 9 MEQ/L (ref 9–15)
AST SERPL-CCNC: 44 U/L (ref 0–40)
BASOPHILS ABSOLUTE: 0 K/UL (ref 0–0.2)
BASOPHILS RELATIVE PERCENT: 0.5 %
BILIRUB SERPL-MCNC: 0.6 MG/DL (ref 0.2–0.7)
BUN BLDV-MCNC: 13 MG/DL (ref 6–20)
CALCIUM SERPL-MCNC: 9.6 MG/DL (ref 8.5–9.9)
CHLORIDE BLD-SCNC: 102 MEQ/L (ref 95–107)
CO2: 27 MEQ/L (ref 20–31)
CREAT SERPL-MCNC: 1.21 MG/DL (ref 0.7–1.2)
EOSINOPHILS ABSOLUTE: 0.1 K/UL (ref 0–0.7)
EOSINOPHILS RELATIVE PERCENT: 2.2 %
GFR AFRICAN AMERICAN: >60
GFR NON-AFRICAN AMERICAN: >60
GLOBULIN: 3.1 G/DL (ref 2.3–3.5)
GLUCOSE BLD-MCNC: 103 MG/DL (ref 70–99)
HBA1C MFR BLD: 5.8 % (ref 4.8–5.9)
HCT VFR BLD CALC: 48.6 % (ref 42–52)
HEMOGLOBIN: 16.1 G/DL (ref 14–18)
LYMPHOCYTES ABSOLUTE: 1.9 K/UL (ref 1–4.8)
LYMPHOCYTES RELATIVE PERCENT: 30.1 %
MCH RBC QN AUTO: 28 PG (ref 27–31.3)
MCHC RBC AUTO-ENTMCNC: 33 % (ref 33–37)
MCV RBC AUTO: 84.8 FL (ref 80–100)
MONOCYTES ABSOLUTE: 0.7 K/UL (ref 0.2–0.8)
MONOCYTES RELATIVE PERCENT: 10.4 %
NEUTROPHILS ABSOLUTE: 3.6 K/UL (ref 1.4–6.5)
NEUTROPHILS RELATIVE PERCENT: 56.8 %
PDW BLD-RTO: 15 % (ref 11.5–14.5)
PLATELET # BLD: 288 K/UL (ref 130–400)
POTASSIUM SERPL-SCNC: 4.8 MEQ/L (ref 3.4–4.9)
RBC # BLD: 5.73 M/UL (ref 4.7–6.1)
SODIUM BLD-SCNC: 138 MEQ/L (ref 135–144)
TOTAL PROTEIN: 7.5 G/DL (ref 6.3–8)
WBC # BLD: 6.3 K/UL (ref 4.8–10.8)

## 2021-04-02 PROCEDURE — 83036 HEMOGLOBIN GLYCOSYLATED A1C: CPT

## 2021-04-02 PROCEDURE — 80053 COMPREHEN METABOLIC PANEL: CPT

## 2021-04-02 PROCEDURE — 36415 COLL VENOUS BLD VENIPUNCTURE: CPT

## 2021-04-02 PROCEDURE — 85025 COMPLETE CBC W/AUTO DIFF WBC: CPT

## 2021-04-18 ASSESSMENT — ENCOUNTER SYMPTOMS
CHEST TIGHTNESS: 0
BACK PAIN: 0
BLOOD IN STOOL: 0
COLOR CHANGE: 0
SHORTNESS OF BREATH: 0

## 2021-04-29 ENCOUNTER — OFFICE VISIT (OUTPATIENT)
Dept: FAMILY MEDICINE CLINIC | Age: 46
End: 2021-04-29
Payer: COMMERCIAL

## 2021-04-29 VITALS
BODY MASS INDEX: 42.53 KG/M2 | WEIGHT: 314 LBS | HEART RATE: 78 BPM | SYSTOLIC BLOOD PRESSURE: 110 MMHG | HEIGHT: 72 IN | RESPIRATION RATE: 18 BRPM | TEMPERATURE: 97.6 F | OXYGEN SATURATION: 95 % | DIASTOLIC BLOOD PRESSURE: 68 MMHG

## 2021-04-29 DIAGNOSIS — R53.83 OTHER FATIGUE: ICD-10-CM

## 2021-04-29 DIAGNOSIS — E78.1 HYPERTRIGLYCERIDEMIA: ICD-10-CM

## 2021-04-29 DIAGNOSIS — R74.8 ELEVATED LIVER ENZYMES: ICD-10-CM

## 2021-04-29 DIAGNOSIS — E16.2 NONDIABETIC HYPOGLYCEMIA: ICD-10-CM

## 2021-04-29 DIAGNOSIS — M54.50 CHRONIC BILATERAL LOW BACK PAIN WITHOUT SCIATICA: ICD-10-CM

## 2021-04-29 DIAGNOSIS — G89.29 CHRONIC BILATERAL LOW BACK PAIN WITHOUT SCIATICA: ICD-10-CM

## 2021-04-29 DIAGNOSIS — R60.0 BILATERAL EDEMA OF LOWER EXTREMITY: Primary | ICD-10-CM

## 2021-04-29 DIAGNOSIS — F33.1 MODERATE EPISODE OF RECURRENT MAJOR DEPRESSIVE DISORDER (HCC): ICD-10-CM

## 2021-04-29 DIAGNOSIS — R55 SYNCOPE, UNSPECIFIED SYNCOPE TYPE: ICD-10-CM

## 2021-04-29 PROCEDURE — 99214 OFFICE O/P EST MOD 30 MIN: CPT | Performed by: NURSE PRACTITIONER

## 2021-04-29 RX ORDER — IBUPROFEN 800 MG/1
800 TABLET ORAL EVERY 6 HOURS PRN
COMMUNITY
End: 2021-04-29 | Stop reason: SDUPTHER

## 2021-04-29 RX ORDER — HYDROCHLOROTHIAZIDE 25 MG/1
25 TABLET ORAL EVERY MORNING
Qty: 90 TABLET | Refills: 1 | Status: SHIPPED | OUTPATIENT
Start: 2021-04-29 | End: 2022-01-09

## 2021-04-29 RX ORDER — BUPROPION HYDROCHLORIDE 150 MG/1
150 TABLET ORAL EVERY MORNING
Qty: 90 TABLET | Refills: 1 | Status: SHIPPED | OUTPATIENT
Start: 2021-04-29 | End: 2022-01-09

## 2021-04-29 RX ORDER — IBUPROFEN 800 MG/1
800 TABLET ORAL EVERY 6 HOURS PRN
Qty: 300 TABLET | Refills: 1 | Status: SHIPPED | OUTPATIENT
Start: 2021-04-29 | End: 2021-07-01

## 2021-04-29 ASSESSMENT — PATIENT HEALTH QUESTIONNAIRE - PHQ9: 1. LITTLE INTEREST OR PLEASURE IN DOING THINGS: 0

## 2021-04-29 NOTE — PROGRESS NOTES
Chief Complaint   Patient presents with    Leg Swelling     patient has been experiencing some leg and feet swelling for few years     Results     patient is following up on lab results. HPI: Lorraine Yang is a 54 y.o. male presenting for follow-up of swelling of the lower extremities. Lower extremity edema: He states that symptoms initially started about 3 years ago. He has noticed that the swelling does get better when he lays down at night. He has not had any issues with his breathing. No shortness of breath or coughing when lying down at night. He has not noticed any varicose veins but has noticed some darkening of his skin to his lower legs and some darker veins near the base of his feet. He denies any redness or swelling of the legs. No pain reported. He was recently seen in [facility] care for this issue. States that he was requesting water pill but given the chronicity of symptoms he was advised to follow-up with me. Chronic low back pain: He states that he has had ongoing issues with back pain for quite some time now. Sits for many hours continuously for his job. He denies any pain going down either leg but admits to having to stretch often to help take pressure off of his back. History of low sugar episodes: He states that if he does not eat for longer periods of time he tends to get shaky and sweaty and feel bad overall. He states that after eating something he quickly feels better. He does have a strong family history of diabetes. He would like to review recent blood work that was done to see if he is a diabetic. Depression: He states overall his mood has been okay. He has not felt sad or anxious but states that he has been lacking motivation and energy a lot lately. He has tried multiple medications for depression that seemed to have side effects. The last when he took was Lexapro and this contributed to insomnia and restless leg symptoms.   He would like to try some type of antidepressant that might be more energizing. He denies any thoughts of self-harm or harm to others. Syncope: occurred last summer. He states that he had been working long hours sitting continuously and when he lifted his arms to stretch he ended up passing out. He woke up later on the ground states that he hit his head and his hip. This is the only time of this ever happened. He states that a couple days prior to this he had an issue with seeing specks in his vision before this happened. He states that he was not really drinking water back then. He does not recall having felt any heart palpitations or even lightheadedness or dizziness at this time. He has not had any episodes since that time. He states that he was not evaluated when this happened. Review of systems: This patient reports no chest pain or pressure. There is no shortness of breath or cough. The patient reports no nausea or vomiting. There is no heartburn or indigestion. There is no diarrhea or constipation. No black, bloody, mucusy or tarry stool noticed. The patient reports no bloating and no change in appetite. I have reviewed the following diagnostic data: recent blood test results. EXAM:  Constitutional Blood pressure 110/68, pulse 78, temperature 97.6 °F (36.4 °C), temperature source Temporal, resp. rate 18, height 6' (1.829 m), weight (!) 314 lb (142.4 kg), SpO2 95 %. .  He has a normal affect, no acute distress, appears well developed and well nourished. Neck:  neck- supple, no mass, non-tender and no bruits  Lungs:  Normal expansion. Clear to auscultation. No rales, rhonchi, or wheezing., No chest wall tenderness. Heart:  Heart sounds are normal.  Regular rate and rhythm without murmur, gallop or rub. Abdomen:  Soft, non-tender, normal bowel sounds. No bruits, organomegaly or masses. Extremities: pulses present in all extremities and trace pedal edema  Spider veins medial ankles.   Mild hemosiderin staining to the inner ankle area as well. DIAGNOSIS:    Diagnosis Orders   1. Bilateral edema of lower extremity  hydroCHLOROthiazide (HYDRODIURIL) 25 MG tablet    XR CHEST (2 VW)   2. Hypertriglyceridemia  Comprehensive Metabolic Panel    Lipid Panel   3. Nondiabetic hypoglycemia  Insulin, Total   4. Chronic bilateral low back pain without sciatica  ibuprofen (ADVIL;MOTRIN) 800 MG tablet    XR LUMBAR SPINE (2-3 VIEWS)   5. Moderate episode of recurrent major depressive disorder (HCC)  buPROPion (WELLBUTRIN XL) 150 MG extended release tablet   6. Other fatigue  TSH without Reflex    T4, Free    Vitamin D 25 Hydroxy    Vitamin B12 & Folate   7. Syncope, unspecified syncope type     8. Elevated liver enzymes         PLAN: Include orders in the DX section. Follow up: 6 weeks and as needed. Blood work one week prior as ordered. 1.  Discussed option of starting low-dose hydrochlorothiazide for chronic swelling of the lower extremities. I do also recommend getting additional blood work and orders given. Recommend chest x-ray as well. We will recheck electrolytes after being on the medication for at least a couple of weeks. 2.  We will plan to get fasting blood work done as well for history of elevated triglycerides. 3.  We will plan to check insulin level with next lab. He was not fasting with most recent blood work and hemoglobin A1c was within normal range. 4.  Recommend baseline x-ray of the lumbar spine. Refill of ibuprofen given. 5.  6.  We will check vitamin levels as well as thyroid and start Wellbutrin once daily. We will plan to follow-up next month at appointment. 7.  He has not had symptoms in nearly a year. Discussed that he may have been dehydrated or have had electrolyte balance at that time. Given the issue with chronic lower extremity edema, you may want to initiate cardiac work-up in the future. He verbalizes understanding. He will notify me if symptoms return.     8.  Discussed elevated liver enzymes with most recent blood work. He states that he has actually been eating healthier and drinking less alcohol than he was in the past.  If levels are still elevated with recheck will recommend ultrasound of the liver. Please note this report has been partially produced using speech recognition software and may cause contain errors related to that system including grammar, punctuation and spelling as well as words and phrases that may seem inappropriate. If there are questions or concerns please feel free to contact me to clarify.         Electronically signed by Azra BLANKOGJENNY, 2:15 PM 4/29/21

## 2021-05-18 ENCOUNTER — HOSPITAL ENCOUNTER (OUTPATIENT)
Dept: LAB | Age: 46
Discharge: HOME | End: 2021-05-18
Payer: COMMERCIAL

## 2021-05-18 DIAGNOSIS — R53.83 OTHER FATIGUE: ICD-10-CM

## 2021-05-18 DIAGNOSIS — E78.1 HYPERTRIGLYCERIDEMIA: ICD-10-CM

## 2021-05-18 DIAGNOSIS — E16.2 NONDIABETIC HYPOGLYCEMIA: ICD-10-CM

## 2021-05-18 LAB
ALBUMIN SERPL-MCNC: 4.4 G/DL (ref 3.5–4.6)
ALP BLD-CCNC: 102 U/L (ref 35–104)
ALT SERPL-CCNC: 49 U/L (ref 0–41)
ANION GAP SERPL CALCULATED.3IONS-SCNC: 15 MEQ/L (ref 9–15)
AST SERPL-CCNC: 47 U/L (ref 0–40)
BILIRUB SERPL-MCNC: 0.4 MG/DL (ref 0.2–0.7)
BUN BLDV-MCNC: 18 MG/DL (ref 6–20)
CALCIUM SERPL-MCNC: 9.8 MG/DL (ref 8.5–9.9)
CHLORIDE BLD-SCNC: 97 MEQ/L (ref 95–107)
CHOLESTEROL, TOTAL: 209 MG/DL (ref 0–199)
CO2: 24 MEQ/L (ref 20–31)
CREAT SERPL-MCNC: 1.35 MG/DL (ref 0.7–1.2)
FOLATE: 7.4 NG/ML (ref 7.3–26.1)
GFR AFRICAN AMERICAN: >60
GFR NON-AFRICAN AMERICAN: 56.7
GLOBULIN: 3.2 G/DL (ref 2.3–3.5)
GLUCOSE BLD-MCNC: 88 MG/DL (ref 70–99)
HDLC SERPL-MCNC: 42 MG/DL (ref 40–59)
INSULIN: 34.1 UIU/ML (ref 2.6–24.9)
LDL CHOLESTEROL CALCULATED: 132 MG/DL (ref 0–129)
POTASSIUM SERPL-SCNC: 4.1 MEQ/L (ref 3.4–4.9)
SODIUM BLD-SCNC: 136 MEQ/L (ref 135–144)
T4 FREE: 1.25 NG/DL (ref 0.84–1.68)
TOTAL PROTEIN: 7.6 G/DL (ref 6.3–8)
TRIGL SERPL-MCNC: 174 MG/DL (ref 0–150)
TSH SERPL DL<=0.05 MIU/L-ACNC: 2.04 UIU/ML (ref 0.44–3.86)
VITAMIN B-12: 431 PG/ML (ref 232–1245)
VITAMIN D 25-HYDROXY: 19.8 NG/ML (ref 30–100)

## 2021-05-18 PROCEDURE — 82607 VITAMIN B-12: CPT

## 2021-05-18 PROCEDURE — 80053 COMPREHEN METABOLIC PANEL: CPT

## 2021-05-18 PROCEDURE — 82306 VITAMIN D 25 HYDROXY: CPT

## 2021-05-18 PROCEDURE — 36415 COLL VENOUS BLD VENIPUNCTURE: CPT

## 2021-05-18 PROCEDURE — 80061 LIPID PANEL: CPT

## 2021-05-18 PROCEDURE — 84439 ASSAY OF FREE THYROXINE: CPT

## 2021-05-18 PROCEDURE — 82746 ASSAY OF FOLIC ACID SERUM: CPT

## 2021-05-18 PROCEDURE — 84443 ASSAY THYROID STIM HORMONE: CPT

## 2021-05-18 PROCEDURE — 83525 ASSAY OF INSULIN: CPT

## 2021-06-30 DIAGNOSIS — M54.50 CHRONIC BILATERAL LOW BACK PAIN WITHOUT SCIATICA: ICD-10-CM

## 2021-06-30 DIAGNOSIS — G89.29 CHRONIC BILATERAL LOW BACK PAIN WITHOUT SCIATICA: ICD-10-CM

## 2021-07-01 RX ORDER — IBUPROFEN 800 MG/1
800 TABLET ORAL EVERY 6 HOURS PRN
Qty: 360 TABLET | Refills: 3 | Status: SHIPPED | OUTPATIENT
Start: 2021-07-01

## 2021-08-16 ENCOUNTER — OFFICE VISIT (OUTPATIENT)
Dept: FAMILY MEDICINE CLINIC | Age: 46
End: 2021-08-16
Payer: COMMERCIAL

## 2021-08-16 VITALS
OXYGEN SATURATION: 95 % | HEART RATE: 88 BPM | BODY MASS INDEX: 42.66 KG/M2 | TEMPERATURE: 98.5 F | DIASTOLIC BLOOD PRESSURE: 78 MMHG | SYSTOLIC BLOOD PRESSURE: 114 MMHG | WEIGHT: 315 LBS | HEIGHT: 72 IN | RESPIRATION RATE: 18 BRPM

## 2021-08-16 DIAGNOSIS — E78.1 HYPERTRIGLYCERIDEMIA: ICD-10-CM

## 2021-08-16 DIAGNOSIS — L91.8 MULTIPLE ACQUIRED SKIN TAGS: ICD-10-CM

## 2021-08-16 DIAGNOSIS — M79.605 ACUTE LEG PAIN, LEFT: Primary | ICD-10-CM
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
## 2021-08-16 DIAGNOSIS — F33.1 MODERATE EPISODE OF RECURRENT MAJOR DEPRESSIVE DISORDER (HCC): ICD-10-CM

## 2021-08-16 DIAGNOSIS — E16.2 NONDIABETIC HYPOGLYCEMIA: ICD-10-CM

## 2021-08-16 DIAGNOSIS — M79.89 LEFT LEG SWELLING: ICD-10-CM

## 2021-08-16 DIAGNOSIS — R74.8 ELEVATED LIVER ENZYMES: ICD-10-CM

## 2021-08-16 PROCEDURE — 99214 OFFICE O/P EST MOD 30 MIN: CPT | Performed by: NURSE PRACTITIONER

## 2021-08-16 RX ORDER — LIDOCAINE 50 MG/G
OINTMENT TOPICAL
Qty: 240 G | Refills: 2
Start: 2021-08-16 | End: 2022-01-09

## 2021-08-16 ASSESSMENT — PATIENT HEALTH QUESTIONNAIRE - PHQ9
SUM OF ALL RESPONSES TO PHQ9 QUESTIONS 1 & 2: 0
2. FEELING DOWN, DEPRESSED OR HOPELESS: 0
SUM OF ALL RESPONSES TO PHQ QUESTIONS 1-9: 0
1. LITTLE INTEREST OR PLEASURE IN DOING THINGS: 0
SUM OF ALL RESPONSES TO PHQ QUESTIONS 1-9: 0
SUM OF ALL RESPONSES TO PHQ QUESTIONS 1-9: 0

## 2021-08-16 NOTE — PROGRESS NOTES
Knee Pain: Patient complains of left knee pain. This is evaluated as a workers compensation injury. The pain began 2 weeks ago. The pain is located global.  He describes the symptoms as sharp, shooting and stabbing. Symptoms improve with rest, medication: topical lidocaine and knee brace which have somewhat helped. The symptoms are worse with activity. The knee has given out or felt unstable. The patient cannot bend and straighten the knee fully. The patient is active in none. Treatment to date has been ice, heat, Tylenol, NSAID's, knee sleeve/brace, without significant relief. Injury occurred on August 3 of this year while at work. He can't push down using the left foot. Skin tags: multiple tags over his body. He states that he first noticed the skin tag issue quite a while ago but there seemed to be more and more over time. They do get uncomfortable and get caught on his clothing. He would like to have them removed at some point. Some are larger in size than others. He has tried over-the-counter treatment with no improvement. Depression: Donna Boothe reports being in a good mood that is stable. The patient is not reporting insomnia, difficulty concentrating and usual interest in activities. This patient is not homicidal or suicidal.    Dyslipidemia: He states overall he has not had any change in his diet. Did have blood work done in June of this year. ROS: This patient reports no chest pain or pressure. There is no shortness of breath or cough. The patient reports no nausea or vomiting. There is no heartburn or indigestion. There is no diarrhea or constipation. No black, bloody, mucusy or tarry stool noticed. The patient reports no bloating and no change in appetite. There is no numbness, tingling or swelling in the extremities other than below the left knee. EXAM:  Constitutional Blood pressure 114/78, pulse 88, temperature 98.5 °F (36.9 °C), temperature source Temporal, resp.  rate 18, is been stable on current medication. No side effects reported. 5.  6.  7.  Reviewed recent blood test results with recommendation to repeat before the end of the year. Liver enzymes are stable, insulin level slightly increased and triglycerides elevated. Dietary education discussed. Please note this report has been partially produced using speech recognition software and may cause contain errors related to that system including grammar, punctuation and spelling as well as words and phrases that may seem inappropriate. If there are questions or concerns please feel free to contact me to clarify.         Electronically signed by MAHI Ngo - CNP-CNP, 2:56 PM 8/16/21

## 2021-09-08 ENCOUNTER — HOSPITAL ENCOUNTER (OUTPATIENT)
Dept: PHYSICAL THERAPY | Age: 46
Setting detail: THERAPIES SERIES
Discharge: HOME | End: 2021-09-08
Payer: COMMERCIAL

## 2021-09-08 ENCOUNTER — APPOINTMENT (OUTPATIENT)
Dept: PHYSICAL THERAPY | Age: 46
End: 2021-09-08
Payer: COMMERCIAL

## 2021-09-08 PROCEDURE — 97161 PT EVAL LOW COMPLEX 20 MIN: CPT | Performed by: PHYSICAL THERAPIST

## 2021-09-08 PROCEDURE — 97016 VASOPNEUMATIC DEVICE THERAPY: CPT | Performed by: PHYSICAL THERAPIST

## 2021-09-08 PROCEDURE — 97110 THERAPEUTIC EXERCISES: CPT | Performed by: PHYSICAL THERAPIST

## 2021-09-08 ASSESSMENT — PAIN SCALES - GENERAL: PAINLEVEL_OUTOF10: 9

## 2021-09-08 ASSESSMENT — PAIN DESCRIPTION - ONSET: ONSET: SUDDEN

## 2021-09-08 ASSESSMENT — PAIN DESCRIPTION - PROGRESSION: CLINICAL_PROGRESSION: GRADUALLY WORSENING

## 2021-09-08 ASSESSMENT — PAIN - FUNCTIONAL ASSESSMENT: PAIN_FUNCTIONAL_ASSESSMENT: PREVENTS OR INTERFERES SOME ACTIVE ACTIVITIES AND ADLS

## 2021-09-08 ASSESSMENT — PAIN DESCRIPTION - DESCRIPTORS: DESCRIPTORS: SHARP

## 2021-09-08 ASSESSMENT — PAIN DESCRIPTION - PAIN TYPE: TYPE: ACUTE PAIN

## 2021-09-08 ASSESSMENT — PAIN DESCRIPTION - LOCATION: LOCATION: KNEE

## 2021-09-08 ASSESSMENT — PAIN DESCRIPTION - ORIENTATION: ORIENTATION: INNER;LEFT

## 2021-09-08 ASSESSMENT — PAIN DESCRIPTION - FREQUENCY: FREQUENCY: INTERMITTENT

## 2021-09-08 NOTE — PROGRESS NOTES
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 Past Surgical History:   has a past surgical history that includes [place] tooth extraction (Bilateral) and Carpal tunnel release.     Vital Signs  Patient Currently in Pain: Yes   Pain Screening  Patient Currently in Pain: Yes  Pain Assessment  Pain Assessment: 0-10  Pain Level: 9 (With weight bearing/walking)  Patient's Stated Pain Goal: No pain  Pain Type: Acute pain  Pain Location: Knee  Pain Orientation: Inner;Left  Pain Descriptors: Sharp  Pain Frequency: Intermittent  Pain Onset: Sudden  Clinical Progression: Gradually worsening  Functional Pain Assessment: Prevents or interferes some active activities and ADLs                Lives With: Alone  Type of Home: House  Home Layout: One level  Home Access: Stairs to enter with rails  Entrance Stairs - Number of Steps: 5  Entrance Stairs - Rails: Right  Bathroom Shower/Tub: Tub/Shower unit  Bathroom Toilet: Standard  Bathroom Equipment: Grab bars in shower  Bathroom Accessibility: Accessible  Receives Help From: Friend(s)  ADL Assistance: Independent  Homemaking Assistance: Independent  Ambulation Assistance: Independent  Transfer Assistance: Independent  Mode of Transportation: Truck  Occupation: Full time employment  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 He had pain and he continued to work. 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 He was sent to Occupational Health. He saw [employer] [employer]. A request was put in for an MRI but it was denied, and they wanted physical therapy first.  He is currently on light duty doing sit down work. Comments: Sitting quietly he has no pain. When he walks he has quite a bit of pain. He states his left calf is sore because he has altered his gait due to pain. Objective:       Gait Deviations  Gait Deviations: Slow Nesha, Decreased step length       Strength RLE  Strength RLE: WNL  Strength LLE  Strength LLE: Exception  L Hip Flexion: 4/5  L Hip ABduction: 4/5  L Knee Flexion: 4/5  L Knee Extension: 4/5         AROM RLE (degrees)  RLE AROM: WFL     AROM LLE (degrees)  LLE AROM : Exceptions  L Hip Flexion 0-125: 0-90  L Hip Extension 0-10: 0-10  L Hip ABduction 0-45: 0-45  L Hip ADduction 0-10: 0-10  L Knee Flexion 0-145: 103  L Knee Extension 0: -12        Observation/Palpation  Posture: Good  Palpation: Tenderness along medial aspect of the left knee, calf is very tight  Observation: Generally swollen  Edema: Circumferential measurements left knee:  Mid patella 51 cm, superior to patella 56 cm, inferior patella 43 cm        Exercises:   Exercises  Exercise 1: Quad sets  Exercise 2: Straight leg raises  Exercise 3: Sitting knee extension  Modalities: Vaso pnuematic device     *Indicates exercise,modality, or manual techniques to be initiated when appropriate  Assessment: Body structures, Functions, Activity limitations: Decreased functional mobility , Decreased ROM, Decreased strength, Increased pain, Decreased endurance (Decreased gait, unable to tolerate regular work duty)  Assessment: Problem List:  1,  Decreased active range of motion of the left knee  2.   Decreased strength of the left prevention strategies   [] Indicate moderate falls risk on eval  []  Use high risk prevention interventions High = Score of 45 and higher   [] Discuss fall prevention strategies   [] Provide supervision during treatment time    Goals  Short term goals  Time Frame for Short term goals: 3-5 treatments  Short term goal 1:  Increase flexion of left knee to 110 degrees  Short term goal 2: Decrease knee extension to -5  Short term goal 3: Decrease circumferential measurements by 1.0 cm all measurements indication a reduction in swelling of the left knee  Short term goal 4: Pain with walking decreased to 3/10  Short term goal 5: 50% improvement in gait with less antalgia, and reciprical pattern  Long term goals  Time Frame for Long term goals : 6-12 treatments  Long term goal 1: Active flexion of the left knee 115 degrees  Long term goal 2: Active extension of the left knee 0 degrees  Long term goal 3: Pain with walking decreased to 1/10  Long term goal 4: Strength of the left knee 5/5  Long term goal 5: No further evidence of swelling of the left knee  Long term goal 6: Patient able to return to regular work duty  Long term goal 7: LEFS will be at least 50-60/80 at discharge    Treatment Initiated : Exercises and vaso pnuematic device    PT Individual Minutes  Time In: 1100  Time Out: 1200  Minutes: 60  Timed Code Treatment Minutes: 15 Minutes  Procedure Minutes: eval 30 minutes, vasopneumatic device 15 minutes     Modality Time In Time Out Total Time Units    PT Evaluation: Low Complexity (67761) 1100 1130 30 1   PT Evaluation: Moderate Complexity (70491)       PT Evaluation:High Complexity (07551)       Ther ex (89370) 1130 1145 15 1   Manual Therapy (68115)       Neuro re-ed (13546)       Massage (52466)       Estim unattended   (14367)           Electronically signed by Nick Mcdaniel, PT on 9/8/21 at 5:20 PM EDT

## 2021-09-08 NOTE — PROGRESS NOTES
[address]  PHYSICAL THERAPY PLAN OF CARE   [address] [place], [address] [facility]         Ph: [phone]  Fax: [phone]    [] Certification  [] Recertification [x]  Plan of Care  [] Progress Note [] Discharge      To:  Evan Cervantes M.D./Darnell ANGULO From:  Destiny Sandra PT  Patient: Ana Cristina Corrales     : 1975  Diagnosis: Sprain left knee     Date: 2021  Treatment Diagnosis: Painful and swollen left knee       Progress Report Period from:  2021  to 2021    Total # of Visits to Date: 1              OBJECTIVE:   Short Term Goals - Time Frame for Short term goals: 3-5 treatments    Goals Current/Discharge status  Met   Short term goal 1:  Increase flexion of left knee to 110 degrees  Flexion 0-103 [] yes  [] no   Short term goal 2: Decrease knee extension to -5  Extension -12 [] yes  [] no   Short term goal 3: Decrease circumferential measurements by 1.0 cm all measurements indication a reduction in swelling of the left knee  Swollen left knee [] yes  [] no   Short term goal 4: Pain with walking decreased to 3/10  Pain with walking 9/10 [] yes  [] no   Short term goal 5: 50% improvement in gait with less antalgia, and reciprical pattern Antalgic gait pattern []  yes  []  no     Long Term Goals - Time Frame for Long term goals : 6-12 treatments  Goals Current/ Discharge status Met   Long term goal 1: Active flexion of the left knee 115 degrees See above [] yes  [] no   Long term goal 2: Active extension of the left knee 0 degrees See above [] yes  [] no   Long term goal 3: Pain with walking decreased to 1/10 See above [] yes  [] no   Long term goal 4: Strength of the left knee 5/5 Strength grossly 4/5 [] yes  [] no   Long term goal 5: No further evidence of swelling of the left knee See above [] yes  [] no    Long term goal 6: Patient able to return to regular work duty Currently on light duty, sit down work [] yes  [] no    Long term goal 7: LEFS will be at least

## 2021-09-10 ENCOUNTER — HOSPITAL ENCOUNTER (OUTPATIENT)
Dept: PHYSICAL THERAPY | Age: 46
Setting detail: THERAPIES SERIES
Discharge: HOME | End: 2021-09-10
Payer: COMMERCIAL

## 2021-09-10 PROCEDURE — 97110 THERAPEUTIC EXERCISES: CPT | Performed by: PHYSICAL THERAPIST

## 2021-09-10 PROCEDURE — G0283 ELEC STIM OTHER THAN WOUND: HCPCS | Performed by: PHYSICAL THERAPIST

## 2021-09-10 ASSESSMENT — PAIN DESCRIPTION - PROGRESSION: CLINICAL_PROGRESSION: GRADUALLY IMPROVING

## 2021-09-10 ASSESSMENT — PAIN DESCRIPTION - DESCRIPTORS: DESCRIPTORS: SHARP

## 2021-09-10 ASSESSMENT — PAIN SCALES - GENERAL: PAINLEVEL_OUTOF10: 5

## 2021-09-10 ASSESSMENT — PAIN DESCRIPTION - PAIN TYPE: TYPE: ACUTE PAIN

## 2021-09-10 ASSESSMENT — PAIN DESCRIPTION - FREQUENCY: FREQUENCY: INTERMITTENT

## 2021-09-10 ASSESSMENT — PAIN DESCRIPTION - LOCATION: LOCATION: KNEE

## 2021-09-10 ASSESSMENT — PAIN DESCRIPTION - ORIENTATION: ORIENTATION: LEFT;INNER

## 2021-09-10 ASSESSMENT — PAIN - FUNCTIONAL ASSESSMENT: PAIN_FUNCTIONAL_ASSESSMENT: PREVENTS OR INTERFERES SOME ACTIVE ACTIVITIES AND ADLS

## 2021-09-10 NOTE — PROGRESS NOTES
[address] [facility]  Outpatient Physical Therapy    Treatment Note        Date: 9/10/2021  Patient: Kimmy York  : 1975  ACCT #: [de-identified]  Referring Practitioner: Caitlyn Melissa M.D./Darnell GUEVARA Diagnosis: Sprain left knee  Treatment Diagnosis: Painful and swollen left knee     Visit Information:  PT Visit Information  Onset Date: 21  PT Insurance Information: [employer]- self insured  Total # of Visits Approved: 12  Total # of Visits to Date: 2  No Show: 0  Canceled Appointment: 0  Progress Note Counter:     Subjective: Patient reports doing his exercises at home and his calf is not as painful and hard to the touch     HEP Compliance:  [x] Good [] Fair [] Poor [] Reports not doing due to:    Vital Signs  Patient Currently in Pain: Yes   Pain Screening  Patient Currently in Pain: Yes  Pain Assessment  Pain Assessment: 0-10  Pain Level: 5  Patient's Stated Pain Goal: No pain  Pain Type: Acute pain  Pain Location: Knee  Pain Orientation: Left; Inner  Pain Descriptors: Sharp  Pain Frequency: Intermittent  Clinical Progression: Gradually improving  Functional Pain Assessment: Prevents or interferes some active activities and ADLs    OBJECTIVE:   Exercises  Exercise 1: Quad sets  Exercise 2: Straight leg raises 2# 10 reps  Exercise 3: Sitting knee extension 2# 10 reps  Exercise 4: Supine heel slides 2# 10 reps  Exercise 5: Sidelying abductionn 2# 10 reps  Exercise 6: Prone knee flexion 0# 10 reps  Exercise 7: Standing ipsi and contralateral hip abduction and extension 10 reps       Modalities:  Modalities  Moist heat: Moist heat prior to exercise  Cryotherapy (Minutes\Location): in conjunction with IES  E-stim (parameters):  One channel medial, one channel lateral left knee for pain control     *Indicates exercise, modality, or manual techniques to be initiated when appropriate    Assessment:   Activity Tolerance  Activity Tolerance: Patient limited by pain, Patient Tolerated treatment well  Activity Tolerance: Patient able to tolerate more exercise today, and some ot the exercise he was able to tolerate 2#    Body structures, Functions, Activity limitations: Decreased functional mobility , Decreased ROM, Decreased strength, Increased pain, Decreased endurance  Assessment: Patient is able to walk with less antalgia today, most likely as his calf is not as stiff. He was able to handle more exercise. He requires further exercise to strengthen his left knee  Treatment Diagnosis: Painful and swollen left knee  Prognosis: Excellent  PT Education: Home Exercise Program  Patient Education: Home exercise program.  Written exercises given, go for short walks at home. Use ice PRN for pain and swelling    Goals:  Short term goals  Time Frame for Short term goals: 3-5 treatments  Short term goal 1: Increase flexion of left knee to 110 degrees  Short term goal 2: Decrease knee extension to -5  Short term goal 3: Decrease circumferential measurements by 1.0 cm all measurements indication a reduction in swelling of the left knee  Short term goal 4: Pain with walking decreased to 3/10  Short term goal 5: 50% improvement in gait with less antalgia, and reciprical pattern     Progress toward goals:good    POST-PAIN       Pain Rating (0-10 pain scale):   4/10   Location and pain description same as pre-treatment unless indicated. Action: [] NA   [x] Perform HEP  [] Meds as prescribed  [] Modalities as prescribed   [] Call Physician     Frequency/Duration:   2 times weekly 6 weeks     Pt to continue current HEP. See objective section for any therapeutic exercise changes, additions or modifications this date.          PT Individual Minutes  Time In: 1540  Time Out: 8236  Minutes: 55  Timed Code Treatment Minutes: 30 Minutes  Procedure Minutes:Moist heat 10 minutes , IES `15 min  Modality Time In Time Out Total Minutes Units    Ther ex (78660) 5674 [id number] 30 2   Manual Therapy (83960)       Neuro re-ed (36912) Massage (30531)       Estim unattended   (90556) [id number] [id number] 15 1     Signature:  Electronically signed by Jignesh Breen PT on 9/10/21 at 4:32 PM EDT

## 2021-09-14 ENCOUNTER — HOSPITAL ENCOUNTER (OUTPATIENT)
Dept: PHYSICAL THERAPY | Age: 46
Setting detail: THERAPIES SERIES
Discharge: HOME | End: 2021-09-14
Payer: COMMERCIAL

## 2021-09-14 PROCEDURE — G0283 ELEC STIM OTHER THAN WOUND: HCPCS | Performed by: PHYSICAL THERAPIST

## 2021-09-14 PROCEDURE — 97110 THERAPEUTIC EXERCISES: CPT | Performed by: PHYSICAL THERAPIST

## 2021-09-14 ASSESSMENT — PAIN DESCRIPTION - DESCRIPTORS: DESCRIPTORS: SHARP

## 2021-09-14 ASSESSMENT — PAIN DESCRIPTION - PROGRESSION: CLINICAL_PROGRESSION: GRADUALLY IMPROVING

## 2021-09-14 ASSESSMENT — PAIN SCALES - GENERAL: PAINLEVEL_OUTOF10: 2

## 2021-09-14 ASSESSMENT — PAIN - FUNCTIONAL ASSESSMENT: PAIN_FUNCTIONAL_ASSESSMENT: PREVENTS OR INTERFERES SOME ACTIVE ACTIVITIES AND ADLS

## 2021-09-14 ASSESSMENT — PAIN DESCRIPTION - PAIN TYPE: TYPE: ACUTE PAIN

## 2021-09-14 ASSESSMENT — PAIN DESCRIPTION - LOCATION: LOCATION: KNEE

## 2021-09-14 ASSESSMENT — PAIN DESCRIPTION - FREQUENCY: FREQUENCY: INTERMITTENT

## 2021-09-14 ASSESSMENT — PAIN DESCRIPTION - ORIENTATION: ORIENTATION: LEFT;INNER

## 2021-09-14 NOTE — PROGRESS NOTES
[address] [facility]  Outpatient Physical Therapy    Treatment Note        Date: 2021  Patient: Goran Alcazar  : 1975  ACCT #: [de-identified]  Referring Practitioner: Rex Up M.D./Darnell GUEVARA Diagnosis: Sprain left knee  Treatment Diagnosis: Painful and swollen left knee     Visit Information:  PT Visit Information  Onset Date: 21  PT Insurance Information: [employer]- self insured  Total # of Visits Approved: 12  Total # of Visits to Date: 4  Progress Note Counter:     Subjective: Patient reports that his knee is feeling better, able to walk better. States he does his exercises at work. HEP Compliance:  [x] Good [] Fair [] Poor [] Reports not doing due to:    Vital Signs  Patient Currently in Pain: Yes   Pain Screening  Patient Currently in Pain: Yes  Pain Assessment  Pain Assessment: 0-10  Pain Level: 2  Patient's Stated Pain Goal: No pain  Pain Type: Acute pain  Pain Location: Knee  Pain Orientation: Left; Inner  Pain Descriptors: Sharp  Pain Frequency: Intermittent  Clinical Progression: Gradually improving  Functional Pain Assessment: Prevents or interferes some active activities and ADLs    OBJECTIVE:   Exercises  Exercise 1: Quad sets  Exercise 2: Straight leg raises 2# 20 reps  Exercise 3: Sitting knee extension 2# 10 reps  Exercise 4: Supine heel slides 2# 20 reps  Exercise 5: Sidelying abductionn 2# 10 reps  Exercise 6: Prone knee flexion 0# 10 reps  Exercise 7: Standing ipsi and contralateral hip abduction and extension 10 reps  Exercise 8: 6 inch step ups          ROM: [] NT  [x] ROM measurements:      AROM LLE (degrees)  L Hip Flexion 0-125: 0-90  L Hip Extension 0-10: 0-10  L Hip ABduction 0-45: 0-45  L Hip ADduction 0-10: 0-10  L Knee Flexion 0-145: 114  L Knee Extension 0: -9        Modalities:  Modalities  Cryotherapy (Minutes\Location): in conjunction with IES  E-stim (parameters):  One channel medial, one channel lateral left knee for pain control     *Indicates exercise, modality, or manual techniques to be initiated when appropriate    Assessment:   Activity Tolerance  Activity Tolerance: Patient Tolerated treatment well  Activity Tolerance: Patient able to tolerate more exercise today, and some ot the exercise he was able to tolerate 2#    Body structures, Functions, Activity limitations: Decreased functional mobility , Decreased ROM, Decreased strength, Increased pain, Decreased endurance  Assessment: Circumferential measurements have improved mid patella 48 cm, superior patella 55.6 cm which indicates a decrease in swelling. His range of motion and his pain has improved. His gait has improved and he is exibiting a reciprical gait. Treatment Diagnosis: Painful and swollen left knee  Prognosis: Excellent  Patient Education: Home exercise program.  Written exercises given, go for short walks at home. Use ice PRN for pain and swelling    Goals:  Short term goals  Time Frame for Short term goals: 3-5 treatments  Short term goal 1:  Increase flexion of left knee to 110 degrees (goal met)  Short term goal 2: Decrease knee extension to -5  Short term goal 3: Decrease circumferential measurements by 1.0 cm all measurements indication a reduction in swelling of the left knee goal met  Short term goal 4: Pain with walking decreased to 3/10 ( goal met )  Short term goal 5: 50% improvement in gait with less antalgia, and reciprical pattern ( goal met )    Long term goals  Time Frame for Long term goals : 6-12 treatments  Long term goal 1: Active flexion of the left knee 115 degrees  Long term goal 2: Active extension of the left knee 0 degrees  Long term goal 3: Pain with walking decreased to 1/10  Long term goal 4: Strength of the left knee 5/5  Long term goal 5: No further evidence of swelling of the left knee  Long term goal 6: Patient able to return to regular work duty  Long term goal 7: LEFS will be at least 50-60/80 at discharge  Progress toward goals:Good    POST-PAIN Pain Rating (0-10 pain scale):  2 /10   Location and pain description same as pre-treatment unless indicated. Action: [] NA   [x] Perform HEP  [] Meds as prescribed  [] Modalities as prescribed   [] Call Physician     Frequency/Duration:  Plan  Times per week: 2  Plan weeks: 6  Current Treatment Recommendations: Strengthening, ROM, Gait Training, Manual Therapy - Soft Tissue Mobilization, Pain Management, Modalities, Home Exercise Program     Pt to continue current HEP. See objective section for any therapeutic exercise changes, additions or modifications this date.          PT Individual Minutes  Time In: 1658  Time Out: 3849  Minutes: 51  Timed Code Treatment Minutes: 36 Minutes  Procedure Minutes:15  Minutes IES   Modality Time In Time Out Total Minutes Units    Ther ex (06055) [id number] 13 2   Manual Therapy ([id number])       Neuro re-ed ([id number])       Massage ([id number])       Estim unattended   ([id number])         Signature:  Electronically signed by Roger Thurston PT on 9/14/21 at 5:40 PM EDT

## 2021-09-16 ENCOUNTER — HOSPITAL ENCOUNTER (OUTPATIENT)
Dept: PHYSICAL THERAPY | Age: 46
Setting detail: THERAPIES SERIES
Discharge: HOME | End: 2021-09-16
Payer: COMMERCIAL

## 2021-09-16 PROCEDURE — 97110 THERAPEUTIC EXERCISES: CPT | Performed by: PHYSICAL THERAPIST

## 2021-09-16 PROCEDURE — G0283 ELEC STIM OTHER THAN WOUND: HCPCS | Performed by: PHYSICAL THERAPIST

## 2021-09-16 ASSESSMENT — PAIN DESCRIPTION - ORIENTATION: ORIENTATION: LEFT;INNER

## 2021-09-16 ASSESSMENT — PAIN DESCRIPTION - LOCATION: LOCATION: KNEE

## 2021-09-16 ASSESSMENT — PAIN DESCRIPTION - FREQUENCY: FREQUENCY: INTERMITTENT

## 2021-09-16 ASSESSMENT — PAIN - FUNCTIONAL ASSESSMENT: PAIN_FUNCTIONAL_ASSESSMENT: PREVENTS OR INTERFERES SOME ACTIVE ACTIVITIES AND ADLS

## 2021-09-16 ASSESSMENT — PAIN SCALES - GENERAL: PAINLEVEL_OUTOF10: 2

## 2021-09-16 ASSESSMENT — PAIN DESCRIPTION - PAIN TYPE: TYPE: ACUTE PAIN

## 2021-09-16 ASSESSMENT — PAIN DESCRIPTION - DESCRIPTORS: DESCRIPTORS: SHARP

## 2021-09-16 ASSESSMENT — PAIN DESCRIPTION - PROGRESSION: CLINICAL_PROGRESSION: GRADUALLY IMPROVING

## 2021-09-16 NOTE — PROGRESS NOTES
, Decreased ROM, Decreased strength, Increased pain, Decreased endurance  Assessment: Patient's pain is slowly decreasing. He is able to increase his weights. Receiving an MRI  in the next two weeks  Treatment Diagnosis: Painful and swollen left knee  Prognosis: Excellent  Patient Education: Home exercise program.  Written exercises given, go for short walks at home. Use ice PRN for pain and swelling    Goals:  Short term goals  Time Frame for Short term goals: 3-5 treatments  Short term goal 1: Increase flexion of left knee to 110 degrees (goal met)  Short term goal 2: Decrease knee extension to -5  Short term goal 3: Decrease circumferential measurements by 1.0 cm all measurements indication a reduction in swelling of the left knee goal met  Short term goal 4: Pain with walking decreased to 3/10 ( goal met )  Short term goal 5: 50% improvement in gait with less antalgia, and reciprical pattern ( goal met )    Long term goals  Time Frame for Long term goals : 6-12 treatments  Long term goal 1: Active flexion of the left knee 115 degrees  Long term goal 2: Active extension of the left knee 0 degrees  Long term goal 3: Pain with walking decreased to 1/10  Long term goal 4: Strength of the left knee 5/5  Long term goal 5: No further evidence of swelling of the left knee  Long term goal 6: Patient able to return to regular work duty  Progress toward goals:Good    POST-PAIN       Pain Rating (0-10 pain scale):  2 /10   Location and pain description same as pre-treatment unless indicated. Action: [] NA   [x] Perform HEP  [] Meds as prescribed  [] Modalities as prescribed   [] Call Physician     Frequency/Duration:  Plan  Times per week: 2  Plan weeks: 6  Current Treatment Recommendations: Strengthening, ROM, Gait Training, Manual Therapy - Soft Tissue Mobilization, Pain Management, Modalities, Home Exercise Program     Pt to continue current HEP.   See objective section for any therapeutic exercise changes, additions or modifications this date.          PT Individual Minutes  Time In: 7628  Time Out: 4850  Minutes: 60  Timed Code Treatment Minutes: 45 Minutes  Procedure Minutes:15 minutes IES   Modality Time In Time Out Total Minutes Units    Ther ex ([id number]) [id number] 47 9   Manual Therapy ([id number])       Neuro re-ed ([id number])       Massage ([id number])       Estim unattended   ([id number])         Signature:  Electronically signed by Yoly Segovia, PT on 9/16/21 at 5:47 PM EDT

## 2021-09-20 ENCOUNTER — HOSPITAL ENCOUNTER (OUTPATIENT)
Dept: PHYSICAL THERAPY | Age: 46
Setting detail: THERAPIES SERIES
Discharge: HOME | End: 2021-09-20
Payer: COMMERCIAL

## 2021-09-20 PROCEDURE — 97110 THERAPEUTIC EXERCISES: CPT | Performed by: PHYSICAL THERAPIST

## 2021-09-20 PROCEDURE — G0283 ELEC STIM OTHER THAN WOUND: HCPCS | Performed by: PHYSICAL THERAPIST

## 2021-09-20 ASSESSMENT — PAIN SCALES - GENERAL: PAINLEVEL_OUTOF10: 1

## 2021-09-20 ASSESSMENT — PAIN DESCRIPTION - PROGRESSION: CLINICAL_PROGRESSION: GRADUALLY WORSENING

## 2021-09-20 ASSESSMENT — PAIN DESCRIPTION - FREQUENCY: FREQUENCY: INTERMITTENT

## 2021-09-20 ASSESSMENT — PAIN DESCRIPTION - PAIN TYPE: TYPE: ACUTE PAIN

## 2021-09-20 ASSESSMENT — PAIN DESCRIPTION - LOCATION: LOCATION: KNEE

## 2021-09-20 ASSESSMENT — PAIN - FUNCTIONAL ASSESSMENT: PAIN_FUNCTIONAL_ASSESSMENT: PREVENTS OR INTERFERES SOME ACTIVE ACTIVITIES AND ADLS

## 2021-09-20 ASSESSMENT — PAIN DESCRIPTION - ORIENTATION: ORIENTATION: LEFT

## 2021-09-20 ASSESSMENT — PAIN DESCRIPTION - DESCRIPTORS: DESCRIPTORS: ACHING

## 2021-09-20 NOTE — PROGRESS NOTES
[address] [facility]  Outpatient Physical Therapy    Treatment Note        Date: 2021  Patient: Alyson Church  : 1975  ACCT #: [de-identified]  Referring Practitioner: Jerrod Sanchez M.D./Darnell GUEVARA Diagnosis: Sprain left knee  Treatment Diagnosis: Painful and swollen left knee     Visit Information:  PT Visit Information  Onset Date: 21  PT Insurance Information: [employer]- self insured  Total # of Visits Approved: 12  Total # of Visits to Date: 6  No Show: 0  Canceled Appointment: 0  Progress Note Counter:     Subjective: Patient states his pain is getting better. HEP Compliance:  [x] Good [] Fair [] Poor [] Reports not doing due to:        Pain Assessment  Pain Assessment: 0-10  Pain Level: 1  Patient's Stated Pain Goal: No pain  Pain Type: Acute pain  Pain Location: Knee  Pain Orientation: Left  Pain Descriptors: Aching  Pain Frequency: Intermittent  Clinical Progression: Gradually worsening  Functional Pain Assessment: Prevents or interferes some active activities and ADLs    OBJECTIVE:   Exercises  Exercise 7: Standing ipsi and contralateral hip abduction and extension 10 reps with red theraband  Exercise 9: Bike level 5- `10 minutes  Exercise 10: Apollo leg press 2 plates both legs 10 reps, right leg 10 reps  Exercise 11: Apollo leg extension and flexion 10 reps 2 plates each    ROM: [] NT  [x] ROM measurements:      AROM LLE (degrees)  L Hip Flexion 0-125: 0-90  L Hip Extension 0-10: 0-10  L Hip ABduction 0-45: 0-45  L Hip ADduction 0-10: 0-10  L Knee Flexion 0-145: 115  L Knee Extension 0: -9           Modalities:  Modalities  Cryotherapy (Minutes\Location): in conjunction with IES  E-stim (parameters):  One channel medial, one channel lateral left knee for pain control     *Indicates exercise, modality, or manual techniques to be initiated when appropriate    Assessment:   Activity Tolerance  Activity Tolerance: Patient able to tolerate the weight machines today    Body structures, Functions, Activity limitations: Decreased functional mobility , Decreased ROM, Decreased strength, Increased pain, Decreased endurance  Assessment: Patient's pain is continuing to decrease. He was able to return to regular duty without increase in pain. He still however is having minor pain on the medial aspect of his left knee. Treatment Diagnosis: Painful and swollen left knee  Prognosis: Excellent  PT Education: Home Exercise Program  Patient Education: Home exercise program.  Written exercises given, go for short walks at home. Use ice PRN for pain and swelling    Goals:  Short term goals  Time Frame for Short term goals: 3-5 treatments  Short term goal 1: Increase flexion of left knee to 110 degrees (goal met)  Short term goal 2: Decrease knee extension to -5  Short term goal 3: Decrease circumferential measurements by 1.0 cm all measurements indication a reduction in swelling of the left knee goal met  Short term goal 4: Pain with walking decreased to 3/10 ( goal met )  Short term goal 5: 50% improvement in gait with less antalgia, and reciprical pattern ( goal met )    Long term goals  Time Frame for Long term goals : 6-12 treatments  Long term goal 1: Active flexion of the left knee 115 degrees (goal met)  Long term goal 2: Active extension of the left knee 0 degrees  Long term goal 3: Pain with walking decreased to 1/10  Long term goal 4: Strength of the left knee 5/5  Long term goal 5: No further evidence of swelling of the left knee  Long term goal 6: Patient able to return to regular work duty (goal met )  Long term goal 7: LEFS will be at least 50-60/80 at discharge  Progress toward goals: Good    POST-PAIN       Pain Rating (0-10 pain scale):  2 /10   Location and pain description same as pre-treatment unless indicated.    Action: [] NA   [x] Perform HEP  [] Meds as prescribed  [] Modalities as prescribed   [] Call Physician     Frequency/Duration:  Plan  Times per week: 1  Plan weeks:

## 2021-09-24 ENCOUNTER — APPOINTMENT (OUTPATIENT)
Dept: PHYSICAL THERAPY | Age: 46
End: 2021-09-24
Payer: COMMERCIAL

## 2021-09-27 ENCOUNTER — HOSPITAL ENCOUNTER (OUTPATIENT)
Age: 46
Setting detail: SPECIMEN
Discharge: HOME | End: 2021-09-27
Payer: COMMERCIAL

## 2021-09-27 ENCOUNTER — OFFICE VISIT (OUTPATIENT)
Dept: FAMILY MEDICINE CLINIC | Age: 46
End: 2021-09-27
Payer: COMMERCIAL

## 2021-09-27 ENCOUNTER — APPOINTMENT (OUTPATIENT)
Dept: PHYSICAL THERAPY | Age: 46
End: 2021-09-27
Payer: COMMERCIAL

## 2021-09-27 VITALS
RESPIRATION RATE: 14 BRPM | HEART RATE: 93 BPM | WEIGHT: 315 LBS | HEIGHT: 72 IN | DIASTOLIC BLOOD PRESSURE: 70 MMHG | BODY MASS INDEX: 42.66 KG/M2 | OXYGEN SATURATION: 96 % | TEMPERATURE: 97.7 F | SYSTOLIC BLOOD PRESSURE: 120 MMHG

## 2021-09-27 DIAGNOSIS — L91.8 MULTIPLE ACQUIRED SKIN TAGS: ICD-10-CM

## 2021-09-27 DIAGNOSIS — L91.8 MULTIPLE ACQUIRED SKIN TAGS: Primary | ICD-10-CM

## 2021-09-27 PROCEDURE — 11200 RMVL SKIN TAGS UP TO&INC 15: CPT | Performed by: NURSE PRACTITIONER

## 2021-09-27 PROCEDURE — 88304 TISSUE EXAM BY PATHOLOGIST: CPT

## 2021-09-27 PROCEDURE — 11201 RMVL SKIN TAGS EA ADDL 10: CPT | Performed by: NURSE PRACTITIONER

## 2021-09-27 ASSESSMENT — PATIENT HEALTH QUESTIONNAIRE - PHQ9
SUM OF ALL RESPONSES TO PHQ QUESTIONS 1-9: 0
SUM OF ALL RESPONSES TO PHQ QUESTIONS 1-9: 0
SUM OF ALL RESPONSES TO PHQ9 QUESTIONS 1 & 2: 0
2. FEELING DOWN, DEPRESSED OR HOPELESS: 0
SUM OF ALL RESPONSES TO PHQ QUESTIONS 1-9: 0
1. LITTLE INTEREST OR PLEASURE IN DOING THINGS: 0

## 2021-09-27 NOTE — PROGRESS NOTES
This pt is here for removal of skin tags that are symptomatic. They are irritated by clothing and/or jewelry. They become iritated and painful. The patient would like them removed. Consent form reviewed and the patient's questions are answered. He states that he has had the skin tags for quite some time and many are getting larger over time. EXAM:  Constitutional Blood pressure 120/70, pulse 93, temperature 97.7 °F (36.5 °C), temperature source Temporal, resp. rate 14, height 6' (1.829 m), weight (!) 322 lb (146.1 kg), SpO2 96 %. ,normal affect, no acute distress, appears well developed and well nourished. Skin exam significant for skin tags about the bilateral axilla and left neck  Procedure: After topical anesthesia with ethyl chloride spray, 16-20 skin tags are removed using scissors, forceps and shave biopsy technique. There was minimal bleeding at some biopsy sites and hemostasis was achieved within a matter of seconds using silver nitrate sticks. Few smaller flat skin tags were cleansed and liquid nitrogen was applied. All sites were covered with a dry sterile dressing and no bleeding noted at site. DIAGNOSIS:    Diagnosis Orders   1. Multiple acquired skin tags  Surgical Pathology    HI REMOVAL OF SKIN TAGS, UP TO 15    HI REMOVAL OF SKIN TAGS, EACH ADD 10       PLAN: Follow up for signs of infection which include the wound getting more red, swollen, tender or warm to the touch. follow up as needed. Blood work one week prior as ordered. Follow up for signs of infection which include the wounds getting red, swollen, tender or warm to the touch. Discussed that scant amount of bleeding can occur but if he has any persistent bleeding he should notify me. He is aware that skin tags will be sent to lab for pathology.       Please note this report has been partially produced using speech recognition software and may cause contain errors related to that system including grammar, punctuation

## 2021-10-05 ENCOUNTER — OFFICE VISIT (OUTPATIENT)
Dept: ORTHOPEDIC SURGERY | Age: 46
End: 2021-10-05
Payer: COMMERCIAL

## 2021-10-05 VITALS
HEIGHT: 72 IN | TEMPERATURE: 96.6 F | HEART RATE: 87 BPM | BODY MASS INDEX: 42.66 KG/M2 | OXYGEN SATURATION: 97 % | WEIGHT: 315 LBS

## 2021-10-05 DIAGNOSIS — S83.282A TEAR OF LATERAL MENISCUS OF LEFT KNEE, CURRENT, UNSPECIFIED TEAR TYPE, INITIAL ENCOUNTER: ICD-10-CM

## 2021-10-05 DIAGNOSIS — S83.242A ACUTE MEDIAL MENISCUS TEAR OF LEFT KNEE, INITIAL ENCOUNTER: Primary | ICD-10-CM

## 2021-10-05 PROCEDURE — 99204 OFFICE O/P NEW MOD 45 MIN: CPT | Performed by: ORTHOPAEDIC SURGERY

## 2021-10-05 NOTE — PROGRESS NOTES
Patient ID:  Kvng Hirsch is a 51 y.o. male who presents today for evaluation of left knee pain. Injury: yes; This is a workers comp case. 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 He planted his leg and twisted and felt a pop in the knee. Since that time, he has been having knee pain, predominantly on the medial side of the left knee. Metal Allergy: no    Location: left knee pain, located on the medial and lateral aspect of the knee  Pain: yes; 5 on a scale of 1 to 10  Onset: sudden  Duration: 1 months  Frequency:  occurs intermittently and occurs daily  Quality: aching and sharp   Swelling: patient notes intermittent swelling of the joint  Aggravating factors: arising from sitting position, twisting and deep knee flexion  Alleviating factors: rest  Mechanical symptoms: catching  Radiation: no    Activities: walking independently  Restriction:  decreased ambulatory tolerance  Progression:  improving    Previous treatment:  Knee brace wear  NSAIDs:  none  PT:  none    Medications:    Current Outpatient Medications on File Prior to Visit   Medication Sig Dispense Refill    lidocaine (XYLOCAINE) 5 % ointment Apply topically as needed. 240 g 2    ibuprofen (ADVIL;MOTRIN) 800 MG tablet TAKE 1 TABLET BY MOUTH  EVERY 6 HOURS AS NEEDED FOR PAIN 360 tablet 3    hydroCHLOROthiazide (HYDRODIURIL) 25 MG tablet Take 1 tablet by mouth every morning 90 tablet 1    buPROPion (WELLBUTRIN XL) 150 MG extended release tablet Take 1 tablet by mouth every morning 90 tablet 1    sildenafil (VIAGRA) 100 MG tablet Take 1 tablet by mouth as needed for Erectile Dysfunction 30 tablet 1    TESTIM 50 MG/5GM (1%) GEL 1% gel Apply 2 packet daily 180 Tube 1     No current facility-administered medications on file prior to visit. Allergies:     Allergies   Allergen Reactions    Vicodin [Hydrocodone-Acetaminophen] Nausea Only     sick       Past Medical History:    Past Medical History:   Diagnosis Date  Anxiety     Depression     Erectile dysfunction     Generalized anxiety disorder     Hypertriglyceridemia 2/4/2019       Past Surgical History:    Past Surgical History:   Procedure Laterality Date    CARPAL TUNNEL RELEASE      WISDOM TOOTH EXTRACTION Bilateral        Social History:    Social History     Socioeconomic History    Marital status:      Spouse name: Not on file    Number of children: Not on file    Years of education: Not on file    Highest education level: Not on file   Occupational History    Not on file   Tobacco Use    Smoking status: Never Smoker    Smokeless tobacco: Current User   Substance and Sexual Activity    Alcohol use: Yes     Comment: occasional    Drug use: No    Sexual activity: Not on file   Other Topics Concern    Not on file   Social History Narrative    Not on file     Social Determinants of Health     Financial Resource Strain: Low Risk     Difficulty of Paying Living Expenses: Not very hard   Food Insecurity: No Food Insecurity    Worried About Running Out of Food in the Last Year: Never true    Que of Food in the Last Year: Never true   Transportation Needs: No Transportation Needs    Lack of Transportation (Medical): No    Lack of Transportation (Non-Medical):  No   Physical Activity:     Days of Exercise per Week:     Minutes of Exercise per Session:    Stress:     Feeling of Stress :    Social Connections:     Frequency of Communication with Friends and Family:     Frequency of Social Gatherings with Friends and Family:     Attends Rastafarian Services:     Active Member of Clubs or Organizations:     Attends Club or Organization Meetings:     Marital Status:    Intimate Partner Violence:     Fear of Current or Ex-Partner:     Emotionally Abused:     Physically Abused:     Sexually Abused:        Family History:    Family History   Problem Relation Age of Onset    Diabetes Other     Heart Disease Father     High Blood Pressure Father     High Cholesterol Father        Occupation:  Labor; construction   - Occupational requirements:  physical labor    Workers Compensation:  Have you missed work for this issue?  no  Is this issue being addressed under a worker's comp claim? yes    Review of Systems:    Review of Systems    Physical Exam:    Examination of the left knee    Gait:  antalgic gait affecting left  Inspection:  neutral  Swelling:  none  Erythema:  none  Ecchymosis:  none  Effusion:  1+  Palpation:  medial joint line and lateral joint line  Extension:  5  Flexion:  110  Strength:  5  Varus/Valgus Instability:  none  Anterior/Posterior Instability:  none  Amalia:  positive  Thessaly:  positive  Modified Apley:  positive  Lachman:  negative  Patellar compression:  negative  Neurological/Vascular:  Sensation grossly intact. Dorsalis pedis palpable and 2+    Radiographs:  Radiographs of the left knee were personally reviewed, and they revealed:  no evidence of fracture and mild medial knee oa    MRI: MRI shows that the patient has tearing of the medial and lateral menisci. Additionally, he has some mild medial compartment osteoarthritis as well as some retropatellar fluid collection. Diagnosis:  Patient has tearing of the medial and lateral meniscus of the left knee; mild degenerative wear of the medial side of the left knee. Plan:    I had a lengthy discussion with the patient. We discussed pertinent anatomy. We talked about cartilage on the end of the bone; we talked about arthritis; we talked about meniscus and meniscal pathology. I explained to him the issues with the tears. We discussed operative versus nonoperative management. The patient is adamant that he would not have surgery unless it is an absolute emergency. I did caution him about mechanical symptoms of locking catching and possible giving way of the knee. Patient is aware of this risk.   I also explained to him that theoretically the tears could propagate and become even more of a problem. The patient wishes to proceed with nonoperative management. He reports that he is getting improvement with brace wear and his pain seems to be subsiding. He opted to go about his activity as tolerated and follow-up as needed. I did caution him against pivoting and loading the joint. No orders of the defined types were placed in this encounter. No orders of the defined types were placed in this encounter. Return if symptoms worsen or fail to improve.     Steven Christensen MD

## 2021-10-12 ENCOUNTER — HOSPITAL ENCOUNTER (OUTPATIENT)
Dept: PHYSICAL THERAPY | Age: 46
Setting detail: THERAPIES SERIES
Discharge: HOME | End: 2021-10-12
Payer: COMMERCIAL

## 2021-10-12 PROCEDURE — G0283 ELEC STIM OTHER THAN WOUND: HCPCS

## 2021-10-12 PROCEDURE — 97110 THERAPEUTIC EXERCISES: CPT

## 2021-10-12 NOTE — PROGRESS NOTES
[address] [facility]  Outpatient Physical Therapy    Treatment Note        Date: 10/12/2021  Patient: Kvng Hirsch  : 1975  ACCT #: [de-identified]  Referring Practitioner: Fuad Bunn M.D./Darnell GUEVARA Diagnosis: Sprain left knee  Treatment Diagnosis: Painful and swollen left knee     Visit Information:  PT Visit Information  Onset Date: 21  PT Insurance Information: [employer]- self insured  Total # of Visits Approved: 12  Total # of Visits to Date: 7  No Show: 0  Canceled Appointment: 0  Progress Note Counter:     Subjective: Pt reports he has two small tears and the ortho MD gave him the option of surgery or conservative tx and the pt chose to conservatively treat the knee right now. Pt reports the knee is most bothersome after sitting at work for awhile then going to get up. Pt sates the compressions sleeves help. Pt denies any catching in the knee. Pt reports he can walk ~5 minutes before the pain really starts to bother him. HEP Compliance:  [x] Good [] Fair [] Poor [] Reports not doing due to:    Vital Signs  Patient Currently in Pain: Denies   Pain Screening  Patient Currently in Pain: Denies    OBJECTIVE:   Exercises  Exercise 1: Bike level 6 x5 mins  Exercise 2: apollo leg press 2x10 20#  Exercise 3: apollo leg ext 20# 2x10; leg curl 30# 2x10  Exercise 4: step ups F/L 6''x10 ea  Exercise 5: 4-way hip RTB x10 ea b/l  Exercise 6: SLS*    Strength: [x] NT  [] MMT completed:    ROM: [x] NT  [] ROM measurements:     Modalities:  Modalities  Cryotherapy (Minutes\Location): in conjunction with IES x10 mins  E-stim (parameters): One channel medial, one channel lateral left knee for pain control x10 mins     *Indicates exercise, modality, or manual techniques to be initiated when appropriate    Assessment:    Body structures, Functions, Activity limitations: Decreased functional mobility , Decreased ROM, Decreased strength, Increased pain, Decreased endurance  Assessment: Progressed therex w/ emphasis on strength and stability of L knee. Pt reporting intermittent \"stabbing\" medial knee pain w/ forward step ups. Pt also challenged w/ 4-way hip. Treatment Diagnosis: Painful and swollen left knee  Prognosis: Excellent     Goals:  Short term goals  Time Frame for Short term goals: 3-5 treatments  Short term goal 1: Increase flexion of left knee to 110 degrees (goal met)  Short term goal 2: Decrease knee extension to -5  Short term goal 3: Decrease circumferential measurements by 1.0 cm all measurements indication a reduction in swelling of the left knee goal met  Short term goal 4: Pain with walking decreased to 3/10 ( goal met )  Short term goal 5: 50% improvement in gait with less antalgia, and reciprical pattern ( goal met )  Long term goals  Time Frame for Long term goals : 6-12 treatments  Long term goal 1: Active flexion of the left knee 115 degrees (goal met)  Long term goal 2: Active extension of the left knee 0 degrees  Long term goal 3: Pain with walking decreased to 1/10  Long term goal 4: Strength of the left knee 5/5  Long term goal 5: No further evidence of swelling of the left knee  Long term goal 6: Patient able to return to regular work duty (goal met )  Long term goal 7: LEFS will be at least 50-60/80 at discharge  Progress toward goals: increase strength    POST-PAIN       Pain Rating (0-10 pain scale):   0/10   Location and pain description same as pre-treatment unless indicated. Action: [] NA   [x] Perform HEP  [] Meds as prescribed  [] Modalities as prescribed   [] Call Physician     Frequency/Duration:  Plan  Times per week: 1  Plan weeks: 2  Current Treatment Recommendations: Strengthening, ROM, Gait Training, Manual Therapy - Soft Tissue Mobilization, Pain Management, Modalities, Home Exercise Program     Pt to continue current HEP. See objective section for any therapeutic exercise changes, additions or modifications this date.     PT Individual Minutes  Time In: 3643  Time Out: 1706  Minutes: 41  Timed Code Treatment Minutes: 31 Minutes  Procedure Minutes:10     Modality Time In Time Out Total Minutes Units    Ther ex ([id number]) [id number] 2901 01 2   CP (02942) 4694 0190 10 0   Estim unattended   ([id number]) 1205 6497 10 1     Signature:  Electronically signed by Amandeep Masters PTA on 10/12/21 at 4:36 PM EDT

## 2021-10-14 ENCOUNTER — HOSPITAL ENCOUNTER (OUTPATIENT)
Dept: PHYSICAL THERAPY | Age: 46
Setting detail: THERAPIES SERIES
Discharge: HOME | End: 2021-10-14
Payer: COMMERCIAL

## 2021-10-14 NOTE — PROGRESS NOTES
Therapy                            Cancellation/No-show Note      Date:  10/14/2021  Patient Name:  Jeromy Suazo  :  1975   MRN:  [id number]  Referring Practitioner: Naz Arevalo M.D./Darnell GUEVARA Visit Information:  PT Visit Information  Onset Date: 21  PT Insurance Information: [employer]- self insured  Total # of Visits Approved: 12  Total # of Visits to Date: 7  No Show: 0  Canceled Appointment: 1  Progress Note Counter:  CX 10/14    For today's appointment patient:  [x]  Cancelled  []  Rescheduled appointment  []  No-show   []  Called pt to remind of next appointment     Reason given by patient:  []  Patient ill  []  Conflicting appointment  []  No transportation    [x]  Conflict with work  []  No reason given  []  Other:      [x] Pt has future appointments scheduled, no follow up needed  [] Pt requests to be on hold.     Reason:   If > 2 weeks please discuss with therapist.  [] Therapist to call pt for follow up     Comments:       Signature: Electronically signed by Thien Abraham PTA on 10/14/21 at 3:10 PM EDT

## 2021-10-19 ENCOUNTER — HOSPITAL ENCOUNTER (OUTPATIENT)
Dept: PHYSICAL THERAPY | Age: 46
Setting detail: THERAPIES SERIES
Discharge: HOME | End: 2021-10-19
Payer: COMMERCIAL

## 2021-10-19 PROCEDURE — 97016 VASOPNEUMATIC DEVICE THERAPY: CPT

## 2021-10-19 PROCEDURE — 97140 MANUAL THERAPY 1/> REGIONS: CPT

## 2021-10-19 ASSESSMENT — PAIN DESCRIPTION - ORIENTATION: ORIENTATION: LEFT

## 2021-10-19 ASSESSMENT — PAIN SCALES - GENERAL: PAINLEVEL_OUTOF10: 8

## 2021-10-19 ASSESSMENT — PAIN DESCRIPTION - DESCRIPTORS: DESCRIPTORS: ACHING

## 2021-10-19 ASSESSMENT — PAIN DESCRIPTION - PAIN TYPE: TYPE: ACUTE PAIN

## 2021-10-19 ASSESSMENT — PAIN DESCRIPTION - LOCATION: LOCATION: KNEE

## 2021-10-19 NOTE — DISCHARGE SUMMARY
[address]  PHYSICAL THERAPY PLAN OF CARE   [address], [address] [facility]         Ph: [phone]  Fax: [phone]    [] Certification  [] Recertification []  Plan of Care  [] Progress Note [x] Discharge      To:  Yesenia Davis M.D./Darnell ANGULO From:  Perico Randle PT  Patient: Leandro Gonzalez     : 1975        Date: 10/19/2021  Treatment Diagnosis: Painful and swollen left knee       Progress Report Period from:  2021  to 10/19/2021    Total # of Visits to Date: 8   No Show: 0    Canceled Appointment: 1     OBJECTIVE:   Short Term Goals - Time Frame for Short term goals: 3-5 treatments    Goals Current/Discharge status  Met   Short term goal 1:  Increase flexion of left knee to 110 degrees (goal met)  AROM LLE (degrees)  L Knee Flexion 0-145: 114 [x] yes  [] no   Short term goal 2: Decrease knee extension to -5  L Knee Extension 0: -5 [] yes  [] no   Short term goal 3: Decrease circumferential measurements by 1.0 cm all measurements indication a reduction in swelling of the left knee goal met  Did not assess, goal met [x] yes  [] no   Short term goal 4: Pain with walking decreased to 3/10  Pain w/ walking 10/10 [] yes  [x] no   Short term goal 5: 50% improvement in gait with less antalgia, and reciprical pattern  Pt cont's to demo increased antalgia w/ gait, though is able to walk w/ reciprocal pattern [x]  yes  [x]  no     Long Term Goals - Time Frame for Long term goals : 6-12 treatments  Goals Current/ Discharge status Met   Long term goal 1: Active flexion of the left knee 115 degrees (goal met) AROM LLE (degrees)  L Knee Flexion 0-145: 114 [] yes  [x] no   Long term goal 2: Active extension of the left knee 0 degrees L Knee Extension 0: -5 [] yes  [x] no   Long term goal 3: Pain with walking decreased to 1/10 Pain w/ walking 10/10 [] yes  [x] no   Long term goal 4: Strength of the left knee 5/5 Strength LLE  L Knee Flexion: 4/5  L Knee Extension: 4+/5 [] yes  [x] no   Long term goal 5: No further evidence of swelling of the left knee Pt demo's mild-moderate edema of L knee [] yes  [x] no    Long term goal 6: Patient able to return to regular work duty (goal met ) Pt has returned to work, but reports pain [x] yes  [] no    Long term goal 7: LEFS will be at least 50-60/80 at discharge LEFS 25/80 [] yes  [x] no        Body structures, Functions, Activity limitations: Decreased functional mobility , Decreased ROM, Decreased strength, Increased pain, Decreased endurance  Assessment: Pt deciding to D/C PT and proceed w/ surgery after the first of the year d/t ongoing pain w/ any functional mobility. Pt states he is okay while sitting still, but once he has to get up to move the pain is excruciating. Pt overall demo's good ROM of L knee w/ fair-good strength. Pt demo's decreased LEFS score since eval d/t ongoing pain and functional limitations. Prognosis: Excellent           PLAN: [] Evaluate and Treat  Frequency/Duration:  Plan  Plan Comment: D/C                        Patient Status:[] Continue/ Initiate plan of Care    [x] Discharge PT. Recommend pt continue with HEP. [] Additional visits requested, Please re-certify for additional visits:          Signature: Objective Information Written By: Electronically signed by Steven Webster PTA on 10/19/21 at 5:25 PM EDT      If you have any questions or concerns, please don't hesitate to call. Thank you for your referral.    I have reviewed this plan of care and certify a need for medically necessary rehabilitation services.     Physician Signature:__________________________________________________________  Date:  Please sign and return

## 2021-10-19 NOTE — PROGRESS NOTES
[address] [facility]  Outpatient Physical Therapy    Treatment Note        Date: 10/19/2021  Patient: Kvng Hirsch  : 1975  ACCT #: [de-identified]  Referring Practitioner: Fuad Bunn M.D./Darnell GUEVARA Treatment Diagnosis: Painful and swollen left knee     Visit Information:  PT Visit Information  Onset Date: 21  PT Insurance Information: [employer]- self insured  Total # of Visits Approved: 12  Total # of Visits to Date: 8  No Show: 0  Canceled Appointment: 1  Progress Note Counter:     Subjective: Pt reports he's had a lot of pain since his last visit. Pt states he plans to have surgery, but not until after the first of the year d/t work conflicts. Pt states he gets a lot of \"pinching\" in his medial knee, but it does not lock up on him. Pt states sitting he has no pain, it's kimberly when he gets up and starts walking he has to immediately sit. HEP Compliance:  [] Good [] Fair [x] Poor [x] Reports not doing due to: pain    Vital Signs  Patient Currently in Pain: Yes   Pain Screening  Patient Currently in Pain: Yes  Pain Assessment  Pain Assessment: 0-10  Pain Level: 8  Pain Type: Acute pain  Pain Location: Knee  Pain Orientation: Left  Pain Descriptors: Aching    OBJECTIVE:   Exercises  Exercise 19: objective measures, reviewed goals and educated pt on what to expect if he decides on surgery. Strength: [] NT  [x] MMT completed:  Strength LLE  L Knee Flexion: 4/5  L Knee Extension: 4+/5     ROM: [] NT  [x] ROM measurements:  AROM LLE (degrees)  L Knee Flexion 0-145: 114  L Knee Extension 0: -5     Modalities:  Modalities  Cryotherapy (Minutes\Location): in conjunction with IES x15 mins  E-stim (parameters): One channel medial, one channel lateral left knee for pain control x15 mins     *Indicates exercise, modality, or manual techniques to be initiated when appropriate    Assessment:    Body structures, Functions, Activity limitations: Decreased functional mobility , Decreased ROM, Decreased strength, Increased pain, Decreased endurance  Assessment: Pt deciding to D/C PT and proceed w/ surgery after the first of the year d/t ongoing pain w/ any functional mobility. Pt states he is okay while sitting still, but once he has to get up to move the pain is excruciating. Pt overall demo's good ROM of L knee w/ fair-good strength. Pt demo's decreased LEFS score since eval d/t ongoing pain and functional limitations. Treatment Diagnosis: Painful and swollen left knee  Prognosis: Excellent     Goals:  Short term goals  Time Frame for Short term goals: 3-5 treatments  Short term goal 1: Increase flexion of left knee to 110 degrees (goal met)  Short term goal 2: Decrease knee extension to -5  Short term goal 3: Decrease circumferential measurements by 1.0 cm all measurements indication a reduction in swelling of the left knee goal met  Short term goal 4: Pain with walking decreased to 3/10 ( goal met )  Short term goal 5: 50% improvement in gait with less antalgia, and reciprical pattern ( goal met )  Long term goals  Time Frame for Long term goals : 6-12 treatments  Long term goal 1: Active flexion of the left knee 115 degrees (goal met)  Long term goal 2: Active extension of the left knee 0 degrees  Long term goal 3: Pain with walking decreased to 1/10  Long term goal 4: Strength of the left knee 5/5  Long term goal 5: No further evidence of swelling of the left knee  Long term goal 6: Patient able to return to regular work duty (goal met )  Long term goal 7: LEFS will be at least 50-60/80 at discharge  Progress toward goals: see D/C    POST-PAIN       Pain Rating (0-10 pain scale):   \"cold and numb\" no numerical rating provided/10   Location and pain description same as pre-treatment unless indicated. Action: [] NA   [x] Perform HEP  [] Meds as prescribed  [] Modalities as prescribed   [] Call Physician     Frequency/Duration:  Plan  Plan Comment: D/C     Pt to continue current HEP.   See objective section for any therapeutic exercise changes, additions or modifications this date.     PT Individual Minutes  Time In: 8795  Time Out: 151 [facility]  Minutes: 38  Timed Code Treatment Minutes: 23 Minutes  Procedure Minutes:15     Modality Time In Time Out Total Minutes Units    Manual Therapy (15202) [id number] 64 2   CP (78013) 5877 2165 15 0   Estim unattended   (09400) 1823 7493 15 1     Signature:  Electronically signed by Aayush Queen PTA on 10/19/21 at 5:22 PM EDT

## 2021-12-03 ENCOUNTER — OFFICE VISIT (OUTPATIENT)
Dept: ORTHOPEDIC SURGERY | Age: 46
End: 2021-12-03
Payer: COMMERCIAL

## 2021-12-03 VITALS
HEIGHT: 72 IN | OXYGEN SATURATION: 96 % | HEART RATE: 87 BPM | BODY MASS INDEX: 42.66 KG/M2 | TEMPERATURE: 96.9 F | WEIGHT: 315 LBS

## 2021-12-03 DIAGNOSIS — S83.282S: ICD-10-CM

## 2021-12-03 DIAGNOSIS — S83.8X2S ACUTE MEDIAL MENISCAL INJURY OF KNEE, LEFT, SEQUELA: Primary | ICD-10-CM

## 2021-12-03 PROCEDURE — G8484 FLU IMMUNIZE NO ADMIN: HCPCS | Performed by: ORTHOPAEDIC SURGERY

## 2021-12-03 PROCEDURE — 99214 OFFICE O/P EST MOD 30 MIN: CPT | Performed by: ORTHOPAEDIC SURGERY

## 2021-12-03 PROCEDURE — G8427 DOCREV CUR MEDS BY ELIG CLIN: HCPCS | Performed by: ORTHOPAEDIC SURGERY

## 2021-12-03 PROCEDURE — G8417 CALC BMI ABV UP PARAM F/U: HCPCS | Performed by: ORTHOPAEDIC SURGERY

## 2021-12-03 PROCEDURE — 4004F PT TOBACCO SCREEN RCVD TLK: CPT | Performed by: ORTHOPAEDIC SURGERY

## 2021-12-03 NOTE — PROGRESS NOTES
Patient ID:  Harini Client is a 51 y.o. male who presents today for follow up of left knee pain. This is a workers comp case. 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 He felt a pop. He felt that he may have been able to manage with nonoperative treatment, but he continues to have mechanical symptoms. He continues to have sharp stabbing pains on the medial and lateral aspects of the knee. The knee is locking up on him. When he goes from seated to a standing position, he has to wait a few minutes and shake the leg before he can begin ambulating. Injury: yes; Twisted the knee and felt a pop on 9/3/2021    Location: left knee pain, located on the medial and lateral aspect of the knee  Pain: yes; 8 on a scale of 1 to 10  Onset: sudden  Duration: 3 months  Frequency:  occurs intermittently and occurs daily  Quality: aching and sharp   Swelling: patient notes intermittent swelling of the joint  Aggravating factors: weight bearing activity, standing, walking, arising from sitting position, twisting and deep knee flexion  Alleviating factors: removing weight from leg and rest  Mechanical symptoms: instability and buckling  Radiation: no    Activities: walking independently  Restriction:  decreased ambulatory tolerance  Progression:  worsening    Previous treatment:  none  NSAIDs:  none  PT:  none    Medications:    Current Outpatient Medications on File Prior to Visit   Medication Sig Dispense Refill    lidocaine (XYLOCAINE) 5 % ointment Apply topically as needed.  240 g 2    ibuprofen (ADVIL;MOTRIN) 800 MG tablet TAKE 1 TABLET BY MOUTH  EVERY 6 HOURS AS NEEDED FOR PAIN 360 tablet 3    hydroCHLOROthiazide (HYDRODIURIL) 25 MG tablet Take 1 tablet by mouth every morning 90 tablet 1    buPROPion (WELLBUTRIN XL) 150 MG extended release tablet Take 1 tablet by mouth every morning 90 tablet 1    sildenafil (VIAGRA) 100 MG tablet Take 1 tablet by mouth as needed for Erectile Dysfunction 30 tablet 1    TESTIM 50 MG/5GM (1%) GEL 1% gel Apply 2 packet daily 180 Tube 1     No current facility-administered medications on file prior to visit. Allergies: Allergies   Allergen Reactions    Vicodin [Hydrocodone-Acetaminophen] Nausea Only     sick       Physical Exam:    Examination of the left knee    Gait:  antalgic gait affecting left  Inspection:  neutral  Swelling:  none  Erythema:  none  Ecchymosis:  none  Effusion:  1+  Palpation:  medial joint line and lateral joint line  Extension:  5  Flexion:  110  Strength:  5  Varus/Valgus stability:  none  Anterior/Posterior Instability:  none  Amalia:  positive  Thessaly:  positive  Modified Apley:  positive  Lachman:  negative  Patellar compression:  negative  Neurological/Vascular:  Sensation grossly intact. Dorsalis pedis palpable and 2+    Radiographs:  None today    MRI: None today, old MRI shows medial and lateral meniscal tears of left knee    Diagnosis:   Diagnosis Orders   1. Acute medial meniscal injury of knee, left, sequela     2. Acute tear lateral meniscus, left, sequela         Plan:    The patient has medial and lateral meniscal tears of the left knee. MRI is positive. Meniscal provocative examination findings are positive. The patient does not like needles and was not thrilled with the idea of surgery, and was hoping that he would have been able to manage with nonoperative management. Unfortunately, patient continues to have significant mechanical symptoms and functional impairment. We discussed all the pertinent anatomy. We discussed arthroscopic partial medial and partial lateral meniscectomies. We talked about the surgery. We talked about the recovery. We went over the risk benefits and alternatives. Risks include, but are not limited to infection, blood clot, persistent pain due to the mild osteoarthritis on the medial side of the knee, and risks of anesthesia. Patient wishes to proceed with the surgery. We shall submit a C9 request.    No orders of the defined types were placed in this encounter. No orders of the defined types were placed in this encounter. Return for Postop. Steven Christensen MD        Surgery Phone: [address] [facility]   Surgery Fax: [phone]    Phone: [phone]          Fax: [phone]: Surgery Scheduling, PAT & PRE-OP Order Form  Call to advance [place] at [phone] at least 24 hours prior to date of service     Surgery Location: [facility] Surgery: [address], [address], [place] Surgery Date: TBD - C9 pending  Time: TBD  Patient's Name: Dave Salas : 1975    SS#:  [id number]  Gender: male  Home Phone:  [phone] Cell Phone: [phone]  Emergency Contact:  Gerardo Quijano   Phone: [phone]  Payor: Alli Wall /  /  /    ID No.: [id number]      PROVIDER TO COMPLETE:  Diagnosis: The primary encounter diagnosis was Acute medial meniscal injury of knee, left, sequela. A diagnosis of Acute tear lateral meniscus, left, sequela was also pertinent to this visit. Procedure/Consent: Arthroscopic partial medial and partial lateral meniscectomy of left knee  CPT CODES: 35081  Case Comments/Implants: None  Surgery Scheduled as:  Outpatient  Anesthesia Requested: Choice with adductor canal block  Referring Family Doctor: MAHI Soliz CNP  [x] [facility] PAT Date/Time:                                                            [x] History & Physical [] Physician will Provide [] Attached [] Dictated [] Other  [x] Follow Anesthesia Pre-Op Orders for X-rays, [facility]     [x] SN & PT to evaluate and treat/educate disease management, medications, home safety & equipment needs for total joint patients  [] Other: ____________________________________________________  Consults: Medical/Cardiac Clearance done by  ____________________  PRE-OP ORDERS:   Allergies: Vicodin [hydrocodone-acetaminophen] Latex Allergies:             Diabetic:           [] IV ________________________  [x] IV Start with J-loop     Preprinted Orders: Attached [] Yes [] No   ANTIBIOTIC PRE-OP: [x] ANCEF 2 gram IVPB if > 120 kg 3 grams IVPB within 1 hour of incision, if ALLERGIC, use VANCOMYCIN 1 gram IV, 2 hours pre-op  [x] TXA Protocol [] Other:   [x] NPO   [] Betablocker (if needed) _____________________________________   [x] Knee high anti-embolic hose [] Thigh high anti-embolic hose   Other: ______________________________________________________    Physician Signature Required:    Date/Time: 12/10/2021

## 2021-12-07 ENCOUNTER — TELEPHONE (OUTPATIENT)
Dept: ORTHOPEDIC SURGERY | Age: 46
End: 2021-12-07

## 2022-01-04 ENCOUNTER — TELEPHONE (OUTPATIENT)
Dept: ORTHOPEDIC SURGERY | Age: 47
End: 2022-01-04

## 2022-01-04 NOTE — TELEPHONE ENCOUNTER
Incoming Call    Caller:  Richard Garcia    Communication (HIPPA):  HIPPA not applicable    Notes:  C9 was approved for patient to have surgery. If you let me know what do you would like to add the patient for surgery I can update the surgery scheduling sheet.     Please Advise

## 2022-01-06 ENCOUNTER — OFFICE VISIT (OUTPATIENT)
Dept: FAMILY MEDICINE CLINIC | Age: 47
End: 2022-01-06
Payer: COMMERCIAL

## 2022-01-06 VITALS
HEIGHT: 72 IN | OXYGEN SATURATION: 94 % | WEIGHT: 315 LBS | SYSTOLIC BLOOD PRESSURE: 132 MMHG | BODY MASS INDEX: 42.66 KG/M2 | TEMPERATURE: 97.4 F | DIASTOLIC BLOOD PRESSURE: 82 MMHG | HEART RATE: 102 BPM

## 2022-01-06 DIAGNOSIS — Z01.818 PREOP TESTING: ICD-10-CM

## 2022-01-06 DIAGNOSIS — U07.1 COVID-19 VIRUS INFECTION: ICD-10-CM

## 2022-01-06 LAB
ANION GAP SERPL CALCULATED.3IONS-SCNC: 7 MEQ/L (ref 9–15)
ATYPICAL LYMPHOCYTE RELATIVE PERCENT: 7 %
BASOPHILS ABSOLUTE: 0 K/UL (ref 0–0.2)
BASOPHILS RELATIVE PERCENT: 1 %
BUN BLDV-MCNC: 7 MG/DL (ref 6–20)
CALCIUM SERPL-MCNC: 8.8 MG/DL (ref 8.5–9.9)
CHLORIDE BLD-SCNC: 100 MEQ/L (ref 95–107)
CO2: 28 MEQ/L (ref 20–31)
CREAT SERPL-MCNC: 1.18 MG/DL (ref 0.7–1.2)
EOSINOPHILS ABSOLUTE: 0 K/UL (ref 0–0.7)
EOSINOPHILS RELATIVE PERCENT: 1 %
GFR AFRICAN AMERICAN: >60
GFR NON-AFRICAN AMERICAN: >60
GLUCOSE BLD-MCNC: 147 MG/DL (ref 70–99)
HCT VFR BLD CALC: 46.8 % (ref 42–52)
HEMATOLOGY PATH CONSULT: YES
HEMOGLOBIN: 15.4 G/DL (ref 14–18)
LYMPHOCYTES ABSOLUTE: 1.1 K/UL (ref 1–4.8)
LYMPHOCYTES RELATIVE PERCENT: 31 %
Lab: ABNORMAL
MCH RBC QN AUTO: 28 PG (ref 27–31.3)
MCHC RBC AUTO-ENTMCNC: 33 % (ref 33–37)
MCV RBC AUTO: 84.9 FL (ref 80–100)
MONOCYTES ABSOLUTE: 0.2 K/UL (ref 0.2–0.8)
MONOCYTES RELATIVE PERCENT: 6.8 %
NEUTROPHILS ABSOLUTE: 1.6 K/UL (ref 1.4–6.5)
NEUTROPHILS RELATIVE PERCENT: 54 %
PDW BLD-RTO: 14.7 % (ref 11.5–14.5)
PERFORMING INSTRUMENT: ABNORMAL
PLATELET # BLD: 212 K/UL (ref 130–400)
PLATELET SLIDE REVIEW: NORMAL
POTASSIUM SERPL-SCNC: 3.7 MEQ/L (ref 3.4–4.9)
QC PASS/FAIL: ABNORMAL
RBC # BLD: 5.51 M/UL (ref 4.7–6.1)
RBC # BLD: NORMAL 10*6/UL
SARS-COV-2, POC: DETECTED
SODIUM BLD-SCNC: 135 MEQ/L (ref 135–144)
WBC # BLD: 3 K/UL (ref 4.8–10.8)

## 2022-01-06 PROCEDURE — 87426 SARSCOV CORONAVIRUS AG IA: CPT | Performed by: PHYSICIAN ASSISTANT

## 2022-01-06 PROCEDURE — 99243 OFF/OP CNSLTJ NEW/EST LOW 30: CPT | Performed by: PHYSICIAN ASSISTANT

## 2022-01-06 PROCEDURE — G8417 CALC BMI ABV UP PARAM F/U: HCPCS | Performed by: PHYSICIAN ASSISTANT

## 2022-01-06 PROCEDURE — G8484 FLU IMMUNIZE NO ADMIN: HCPCS | Performed by: PHYSICIAN ASSISTANT

## 2022-01-06 PROCEDURE — G8427 DOCREV CUR MEDS BY ELIG CLIN: HCPCS | Performed by: PHYSICIAN ASSISTANT

## 2022-01-06 ASSESSMENT — PATIENT HEALTH QUESTIONNAIRE - PHQ9
SUM OF ALL RESPONSES TO PHQ9 QUESTIONS 1 & 2: 0
SUM OF ALL RESPONSES TO PHQ QUESTIONS 1-9: 0
SUM OF ALL RESPONSES TO PHQ9 QUESTIONS 1 & 2: 0
1. LITTLE INTEREST OR PLEASURE IN DOING THINGS: 0
1. LITTLE INTEREST OR PLEASURE IN DOING THINGS: 0
2. FEELING DOWN, DEPRESSED OR HOPELESS: 0
SUM OF ALL RESPONSES TO PHQ QUESTIONS 1-9: 0
2. FEELING DOWN, DEPRESSED OR HOPELESS: 0
SUM OF ALL RESPONSES TO PHQ QUESTIONS 1-9: 0
SUM OF ALL RESPONSES TO PHQ QUESTIONS 1-9: 0

## 2022-01-06 NOTE — PROGRESS NOTES
Preoperative Consultation      Pastora Soto  YOB: 1975    Date of Service:  1/6/2022    Vitals:    01/06/22 1357   BP: 132/82   Site: Right Upper Arm   Position: Sitting   Cuff Size: Large Adult   Pulse: 102   Temp: 97.4 °F (36.3 °C)   SpO2: 94%   Weight: (!) 332 lb 9.6 oz (150.9 kg)   Height: 6' (1.829 m)      Wt Readings from Last 2 Encounters:   01/06/22 (!) 332 lb 9.6 oz (150.9 kg)   12/03/21 (!) 322 lb (146.1 kg)     BP Readings from Last 3 Encounters:   01/06/22 132/82   09/27/21 120/70   08/16/21 114/78        Chief Complaint   Patient presents with    Pre-op Exam     Patient is here for pre-op examination. Allergies   Allergen Reactions    Vicodin [Hydrocodone-Acetaminophen] Nausea Only     sick     Outpatient Medications Marked as Taking for the 1/6/22 encounter (Office Visit) with KRIS Menchaca   Medication Sig Dispense Refill    ibuprofen (ADVIL;MOTRIN) 800 MG tablet TAKE 1 TABLET BY MOUTH  EVERY 6 HOURS AS NEEDED FOR PAIN 360 tablet 3       This patient presents to the office today for a preoperative consultation at the request of surgeon, Dr. Ion Mesa, who plans on performing arthroscopic partial medial and partial lateral meniscectomy of left knee on January 10 at [facility]. The current problem began 5 months ago, and symptoms have been unchanged with time. Conservative therapy: N/A.     Planned anesthesia: general  Known anesthesia problems: None   Bleeding risk: No recent or remote history of abnormal bleeding  Personal or FH of DVT/PE: No    Patient objection to receiving blood products: No    Patient Active Problem List   Diagnosis    Heel spur    Generalized anxiety disorder    Moderate episode of recurrent major depressive disorder ([facility] [address]    Hypertriglyceridemia    Other male erectile dysfunction    Reactive depression    Preop testing       Past Medical History:   Diagnosis Date    Anxiety     Depression     Erectile dysfunction     Generalized anxiety disorder     Hypertriglyceridemia 2/4/2019     Past Surgical History:   Procedure Laterality Date    CARPAL TUNNEL RELEASE      WISDOM TOOTH EXTRACTION Bilateral      Family History   Problem Relation Age of Onset    Diabetes Other     Heart Disease Father     High Blood Pressure Father     High Cholesterol Father      Social History     Socioeconomic History    Marital status:      Spouse name: Not on file    Number of children: Not on file    Years of education: Not on file    Highest education level: Not on file   Occupational History    Not on file   Tobacco Use    Smoking status: Never Smoker    Smokeless tobacco: Current User   Substance and Sexual Activity    Alcohol use: Yes     Comment: occasional    Drug use: No    Sexual activity: Not on file   Other Topics Concern    Not on file   Social History Narrative    Not on file     Social Determinants of Health     Financial Resource Strain: Low Risk     Difficulty of Paying Living Expenses: Not very hard   Food Insecurity: No Food Insecurity    Worried About Running Out of Food in the Last Year: Never true    Que of Food in the Last Year: Never true   Transportation Needs: No Transportation Needs    Lack of Transportation (Medical): No    Lack of Transportation (Non-Medical):  No   Physical Activity:     Days of Exercise per Week: Not on file    Minutes of Exercise per Session: Not on file   Stress:     Feeling of Stress : Not on file   Social Connections:     Frequency of Communication with Friends and Family: Not on file    Frequency of Social Gatherings with Friends and Family: Not on file    Attends Jehovah's witness Services: Not on file    Active Member of Clubs or Organizations: Not on file    Attends Club or Organization Meetings: Not on file    Marital Status: Not on file   Intimate Partner Violence:     Fear of Current or Ex-Partner: Not on file    Emotionally Abused: Not on file    Physically Abused: Not on file    Sexually Abused: Not on file   Housing Stability:     Unable to Pay for Housing in the Last Year: Not on file    Number of Places Lived in the Last Year: Not on file    Unstable Housing in the Last Year: Not on file       Review of Systems  A comprehensive review of systems was negative except for what was noted in the HPI. Physical Exam   Constitutional: He is oriented to person, place, and time. He appears well-developed and well-nourished. No distress. HENT:   Head: Normocephalic and atraumatic. Mouth/Throat: Uvula is midline, oropharynx is clear and moist and mucous membranes are normal.   Eyes: Conjunctivae and EOM are normal. Pupils are equal, round, and reactive to light. Neck: Trachea normal and normal range of motion. Neck supple. No JVD present. Carotid bruit is not present. No mass and no thyromegaly present. Cardiovascular: Normal rate, regular rhythm, normal heart sounds and intact distal pulses. Exam reveals no gallop and no friction rub. No murmur heard. Pulmonary/Chest: Effort normal and breath sounds normal. No respiratory distress. He has no wheezes. He has no rales. Abdominal: Soft. Normal aorta and bowel sounds are normal. He exhibits no distension and no mass. There is no hepatosplenomegaly. No tenderness. Musculoskeletal: He exhibits no edema and no tenderness. Neurological: He is alert and oriented to person, place, and time. He has normal strength. No cranial nerve deficit or sensory deficit. Coordination and gait normal.   Skin: Skin is warm and dry. No rash noted. No erythema. Psychiatric: He has a normal mood and affect. His behavior is normal.     EKG Interpretation:  normal EKG, normal sinus rhythm, unchanged from previous tracings. Lab Review   No visits with results within 6 Month(s) from this visit.    Latest known visit with results is:   Hospital Outpatient Visit on 05/18/2021   Component Date Value    Insulin 05/18/2021 34.1*    Sodium 05/18/2021 136     Potassium 05/18/2021 4.1     Chloride 05/18/2021 97     CO2 05/18/2021 24     Anion Gap 05/18/2021 15     Glucose 05/18/2021 88     BUN 05/18/2021 18     CREATININE 05/18/2021 1.35*    GFR Non- 05/18/2021 56.7*    GFR  05/18/2021 >60.0     Calcium 05/18/2021 9.8     Total Protein 05/18/2021 7.6     Albumin 05/18/2021 4.4     Total Bilirubin 05/18/2021 0.4     Alkaline Phosphatase 05/18/2021 102     ALT 05/18/2021 49*    AST 05/18/2021 47*    Globulin 05/18/2021 3.2     Cholesterol, Total 05/18/2021 209*    Triglycerides 05/18/2021 174*    HDL 05/18/2021 42     LDL Calculated 05/18/2021 132*    TSH 05/18/2021 2.040     T4 Free 05/18/2021 1.25     Vit D, 25-Hydroxy 05/18/2021 19.8*    Vitamin B-12 05/18/2021 431     Folate 05/18/2021 7.4            Assessment:       51 y.o. patient with planned surgery as above. Known risk factors for perioperative complications: None  Current medications which may produce withdrawal symptoms if withheld perioperatively: none      Plan:     1. Preoperative workup as follows: hemoglobin, hematocrit, electrolytes, creatinine, glucose  2. Change in medication regimen before surgery: None  3. Prophylaxis for cardiac events with perioperative beta-blockers: Not indicated  ACC/AHA indications for pre-operative beta-blocker use:    · Vascular surgery with history of postitive stress test  · Intermediate or high risk surgery with history of CAD   · Intermediate or high risk surgery with multiple clinical predictors of CAD- 2 of the following: history of compensated or prior heart failure, history of cerebrovascular disease, DM, or renal insufficiency    Routine administration of higher-dose, long-acting metoprolol in beta-blockernaïve patients on the day of surgery, and in the absence of dose titration is associated with an overall increase in mortality.   Beta-blockers should be started days to weeks prior to surgery and titrated to pulse < 70.  4. Deep vein thrombosis prophylaxis: regimen to be chosen by surgical team  5.  Please review above information for surgical clearance

## 2022-01-07 LAB — HEMATOLOGY PATH CONSULT: NORMAL

## 2022-01-09 ENCOUNTER — OFFICE VISIT (OUTPATIENT)
Dept: FAMILY MEDICINE CLINIC | Age: 47
End: 2022-01-09
Payer: COMMERCIAL

## 2022-01-09 VITALS
WEIGHT: 315 LBS | BODY MASS INDEX: 42.66 KG/M2 | TEMPERATURE: 96.9 F | HEART RATE: 103 BPM | HEIGHT: 72 IN | OXYGEN SATURATION: 97 %

## 2022-01-09 DIAGNOSIS — U07.1 COVID-19: Primary | ICD-10-CM

## 2022-01-09 DIAGNOSIS — J06.9 UPPER RESPIRATORY INFECTION WITH COUGH AND CONGESTION: ICD-10-CM

## 2022-01-09 PROCEDURE — G8484 FLU IMMUNIZE NO ADMIN: HCPCS | Performed by: NURSE PRACTITIONER

## 2022-01-09 PROCEDURE — G8417 CALC BMI ABV UP PARAM F/U: HCPCS | Performed by: NURSE PRACTITIONER

## 2022-01-09 PROCEDURE — G8427 DOCREV CUR MEDS BY ELIG CLIN: HCPCS | Performed by: NURSE PRACTITIONER

## 2022-01-09 PROCEDURE — 99213 OFFICE O/P EST LOW 20 MIN: CPT | Performed by: NURSE PRACTITIONER

## 2022-01-09 PROCEDURE — 4004F PT TOBACCO SCREEN RCVD TLK: CPT | Performed by: NURSE PRACTITIONER

## 2022-01-09 RX ORDER — AZITHROMYCIN 250 MG/1
TABLET, FILM COATED ORAL
Qty: 6 TABLET | Refills: 0 | Status: SHIPPED | OUTPATIENT
Start: 2022-01-09 | End: 2022-01-14

## 2022-01-09 RX ORDER — METHYLPREDNISOLONE 4 MG/1
TABLET ORAL
Qty: 1 KIT | Refills: 0 | Status: SHIPPED | OUTPATIENT
Start: 2022-01-09 | End: 2022-01-15

## 2022-01-09 ASSESSMENT — ENCOUNTER SYMPTOMS
COUGH: 1
ABDOMINAL PAIN: 0
SORE THROAT: 0
CHEST TIGHTNESS: 1
SHORTNESS OF BREATH: 1

## 2022-01-09 NOTE — PATIENT INSTRUCTIONS
1. Prescription for Z-antonio sent to [facility]. 2. Stay home and self-isolate except to receive medical care for 5 days after your positive test for COVID-19 (stay home through Tuesday, 01/11). 3. May leave your house/ return to work Wednesday, 01/12/21, as long as:  · Your symptoms are resolving or have resolved; and  · At least 24 hrs have passed since you last had a fever without taking medicine to lower fever; and  · Continue to wear a well-fitted mask any time you are around others indoors for an additional 5 days (through Sunday 01/16/22). 4. If you have a fever, continue to stay home until your fever resolves. COVID-19 Things To Know:  · Supportive care is the mainstay of treatment for COVID-19.  · Drink plenty of fluids and rest as much as needed. · May take over the counter acetaminophen (brand name: Tylenol) as needed for pain/ fever. · Upright position when sleeping to help with postnasal drainage, salt water gargles every hour or so as needed for sore throat. · Monitor your symptoms + contact a medical provider if your symptoms are worsening- such as difficulty breathing. · Follow social distancing guidelines and wear a face mask if leaving home is necessary. Seek medical care immediately (ER/ call 911) if you have trouble breathing, persistent pain or pressure in the chest, new confusion, inability to wake or stay awake, bluish lips or face, or any other signs/ symptoms that you think could be an emergency. Antibiotic Instructions: Complete the full course of antibiotics as ordered. Take each dose with a small snack or meal to lessen potential GI upset. To prevent antibiotic resistance, please take medication as ordered and for the full duration even if you start to feel better. Consider intake of yogurt or probiotic during antibiotic use and for a few days after to help reduce the risk of developing a secondary infection.  Separate the yogurt and antibiotic by at least 1 hour. Avoid alcohol while taking antibiotics. Thank you for choosing us for your care  Patient Education     10 Things to Do When You Have COVID-19      Stay home. Don't go to school, work, or public areas. And don't use public transportation, ride-shares, or taxis unless you have no choice. Leave your home only if you need to get medical care. But call the doctor's office first so they know you're coming. And wear a mask. Ask before leaving isolation. Follow your doctor's advice about when it is safe for you to leave isolation. Wear a mask when you are around other people. It can help stop the spread of the virus. Limit contact with people in your home. If possible, stay in a separate bedroom and use a separate bathroom. Avoid contact with pets and other animals. If possible, have a friend or family member care for them while you're sick. Cover your mouth and nose with a tissue when you cough or sneeze. Then throw the tissue in the trash right away. Wash your hands often, especially after you cough or sneeze. Use soap and water, and scrub for at least 20 seconds. If soap and water aren't available, use an alcohol-based hand . Don't share personal household items. These include bedding, towels, cups and glasses, and eating utensils. Clean and disinfect your home every day. Use household  or disinfectant wipes or sprays. If needed, take acetaminophen (Tylenol) or ibuprofen (Advil, Motrin) to relieve fever and body aches. Read and follow all instructions on the label. Current as of: March 26, 2021               Content Version: 13.1  © 2006-2021 [employer]. Care instructions adapted under license by [facility] ([facility]). If you have questions about a medical condition or this instruction, always ask your healthcare professional. [place] [address] any warranty or liability for your use of this information.

## 2022-01-09 NOTE — PROGRESS NOTES
[address] Encounter    CHIEF COMPLAINT:     Monroe Alfred (: 1975) is a 51 y.o. male who presents today for:  Chief Complaint   Patient presents with    Shortness of Breath     tested positive for covid now feeling short of breath       ASSESSMENT/PLAN    1. COVID-19  Comments:  well-appearing feels chest tightness, easily winded. breathing non-labored while in office. discussed supportive care, anticipate 1-2 wks of cough. medrol taper  2. Upper respiratory infection with cough and congestion  Comments:  started on zithromax. discussed supportive care, red flag signs. close PCP follow-up if not improving/ worsening. Orders:  -     methylPREDNISolone (MEDROL DOSEPACK) 4 MG tablet; Take by mouth as directed., Disp-1 kit, R-0Normal  -     azithromycin (ZITHROMAX) 250 MG tablet; 500 mg on day 1, then 250 mg days 2-5., Disp-6 tablet, R-0Normal    - Medications as ordered. - Analgesia and fever control with OTC acetaminophen, ibuprofen prn.  - Supportive care- fluids and rest, upright position for sleep + humidifier, intranasal saline, salt water gargles prn for congestion/ comfort. - Anticipate 1-2 weeks of cough. Isolation  Stay home and self-isolate except medical care x 5d from date of positive test.  Day 0 is the date of the positive test (stay home through ). May leave home/ RTW  as long as no fevers x 24 hrs w/o tantipyretics, sxs are improved, and can wear a well-fitted mask  X 5 additional days    Return for follow-up with your PCP if your symptoms do not begin to resolve within the next week. SUBJECTIVE/OBJECTIVE:    Han Rubio was scheduled for L knee surgery with Dr. Howard Yen 01/10 and tested positive for covid at his pre-op exam . Asymptomatic at the pre-op exam; now has sxs. Didn't receive any instructions re: covid-19, isolation, etc at the pre-op visit. Cough  This is a new problem. The current episode started in the past 7 days. The problem has been gradually worsening. The problem occurs hourly. The cough is productive of sputum. Associated symptoms include nasal congestion, postnasal drip, shortness of breath and wheezing. Pertinent negatives include no chest pain, chills, fever, headaches, hemoptysis, myalgias or sore throat. Associated symptoms comments: + denies loss of smell or loss of taste. COVID-19 exposure source: unknown. Relevant PMH: no pertinent PMH. Smoking history: patient  reports that he has never smoked. He uses smokeless tobacco.  No recent travel. Not vaccinated for covid-19. Previous Medications    IBUPROFEN (ADVIL;MOTRIN) 800 MG TABLET    TAKE 1 TABLET BY MOUTH  EVERY 6 HOURS AS NEEDED FOR PAIN    SILDENAFIL (VIAGRA) 100 MG TABLET    Take 1 tablet by mouth as needed for Erectile Dysfunction    TESTIM 50 MG/5GM (1%) GEL 1% GEL    Apply 2 packet daily       Allergies   Allergen Reactions    Vicodin [Hydrocodone-Acetaminophen] Nausea Only     sick        Review of Systems   Constitutional: Positive for fatigue. Negative for chills and fever. HENT: Positive for congestion, postnasal drip and sinus pressure. Negative for sore throat. Respiratory: Positive for cough, chest tightness, shortness of breath and wheezing. Negative for hemoptysis. Cardiovascular: Negative for chest pain, palpitations and leg swelling. Gastrointestinal: Negative for abdominal pain, diarrhea, nausea and vomiting. Musculoskeletal: Negative for myalgias. Neurological: Negative for headaches. Vitals:  Vitals:    01/09/22 1245   Pulse: 103   Temp: 96.9 °F (36.1 °C)   TempSrc: Temporal   SpO2: 97%   Weight: (!) 326 lb (147.9 kg)   Height: 6' (1.829 m)     Physical Exam  Vitals and nursing note reviewed. Constitutional:       General: He is not in acute distress. Appearance: He is well-groomed. He is not toxic-appearing. HENT:      Head: Normocephalic and atraumatic.       Right Ear: External ear normal. Left Ear: External ear normal.      Nose: Mucosal edema and congestion present. No nasal tenderness. Right Sinus: No maxillary sinus tenderness. Left Sinus: No maxillary sinus tenderness. Mouth/Throat:      Lips: Pink. No lesions. Mouth: Mucous membranes are moist. No oral lesions. Pharynx: Oropharynx is clear. Uvula midline. Posterior oropharyngeal erythema (mild) present. No oropharyngeal exudate. Tonsils: No tonsillar exudate or tonsillar abscesses. Eyes:      General: Lids are normal.      Extraocular Movements: Extraocular movements intact. Conjunctiva/sclera: Conjunctivae normal.      Pupils: Pupils are equal, round, and reactive to light. Neck:      Trachea: Trachea normal.   Cardiovascular:      Rate and Rhythm: Normal rate and regular rhythm. Heart sounds: S1 normal and S2 normal. No murmur heard. No friction rub. No gallop. Pulmonary:      Effort: Pulmonary effort is normal. No tachypnea. Breath sounds: Normal air entry. Rhonchi (b/l upper lobser posterioly) present. No decreased breath sounds, wheezing or rales. Musculoskeletal:         General: Normal range of motion. Cervical back: Normal range of motion and neck supple. Right lower leg: No edema. Left lower leg: No edema. Lymphadenopathy:      Cervical: No cervical adenopathy. Skin:     General: Skin is warm and dry. Neurological:      General: No focal deficit present. Mental Status: He is alert. Mental status is at baseline. Gait: Gait is intact. Psychiatric:         Mood and Affect: Mood and affect normal.     POC Testing Today: No results found for this visit on 01/09/22. Antibiotic Instructions: Complete the full course of antibiotics as ordered. Take each dose with a small snack or meal to lessen potential GI upset. To prevent antibiotic resistance, please take medication as ordered and for the full duration even if you start to feel better.  Consider intake of yogurt or probiotic during antibiotic use and for a few days after to help reduce the risk of developing a secondary infection. Separate the yogurt and antibiotic by at least 1 hour. Avoid alcohol while taking antibiotics. Oral Steroid Instructions: Take each dose with a small snack or meal to lessen potential GI upset. Follow dosing instructions provided with prescription. Common side effects include difficulty sleeping and irritability. Take full course as ordered. Side effects and adverse effects of any medication prescribed today, as well as treatment plan/rationale, follow-up care, and result expectations have been discussed with the patient. Cornelia Montana expresses understanding and wishes to proceed with the plan. Discussed signs and symptoms which require immediate follow-up in ED/call to 911. Understanding verbalized. I have reviewed and updated the electronic medical record.     Electronically signed by MAHI Turcios CNP on 1/9/2022 at 1:56 PM

## 2022-01-17 ENCOUNTER — TELEPHONE (OUTPATIENT)
Dept: ORTHOPEDIC SURGERY | Age: 47
End: 2022-01-17

## 2022-01-31 ENCOUNTER — OFFICE VISIT (OUTPATIENT)
Dept: FAMILY MEDICINE CLINIC | Age: 47
End: 2022-01-31
Payer: COMMERCIAL

## 2022-01-31 VITALS
HEIGHT: 72 IN | OXYGEN SATURATION: 96 % | TEMPERATURE: 96.9 F | DIASTOLIC BLOOD PRESSURE: 78 MMHG | WEIGHT: 315 LBS | SYSTOLIC BLOOD PRESSURE: 132 MMHG | BODY MASS INDEX: 42.66 KG/M2 | HEART RATE: 111 BPM

## 2022-01-31 DIAGNOSIS — Z01.818 PRE-OP EXAM: Primary | ICD-10-CM

## 2022-01-31 DIAGNOSIS — Z01.818 PRE-OP EXAM: ICD-10-CM

## 2022-01-31 PROBLEM — U07.1 COVID-19 VIRUS INFECTION: Status: RESOLVED | Noted: 2022-01-06 | Resolved: 2022-01-31

## 2022-01-31 PROBLEM — F32.A DEPRESSION: Status: RESOLVED | Noted: 2019-02-12 | Resolved: 2022-01-31

## 2022-01-31 PROBLEM — F33.1 MODERATE EPISODE OF RECURRENT MAJOR DEPRESSIVE DISORDER (HCC): Status: RESOLVED | Noted: 2019-01-22 | Resolved: 2022-01-31

## 2022-01-31 PROBLEM — F32.A DEPRESSION: Status: ACTIVE | Noted: 2019-02-12

## 2022-01-31 LAB — SARS-COV-2, PCR: NOT DETECTED

## 2022-01-31 PROCEDURE — G8427 DOCREV CUR MEDS BY ELIG CLIN: HCPCS | Performed by: NURSE PRACTITIONER

## 2022-01-31 PROCEDURE — 99243 OFF/OP CNSLTJ NEW/EST LOW 30: CPT | Performed by: NURSE PRACTITIONER

## 2022-01-31 PROCEDURE — G8417 CALC BMI ABV UP PARAM F/U: HCPCS | Performed by: NURSE PRACTITIONER

## 2022-01-31 PROCEDURE — G8484 FLU IMMUNIZE NO ADMIN: HCPCS | Performed by: NURSE PRACTITIONER

## 2022-01-31 NOTE — PROGRESS NOTES
Preoperative Consultation      Gerardo Mcgee  YOB: 1975     Date of Service:  1/31/2022    Vitals:    01/31/22 1527   BP: 132/78   Pulse: 111   Temp: 96.9 °F (36.1 °C)   TempSrc: Infrared   SpO2: 96%   Weight: (!) 337 lb (152.9 kg)   Height: 6' (1.829 m)      Wt Readings from Last 2 Encounters:   01/31/22 (!) 337 lb (152.9 kg)   01/09/22 (!) 326 lb (147.9 kg)     BP Readings from Last 3 Encounters:   01/31/22 132/78   01/06/22 132/82   09/27/21 120/70        Chief Complaint   Patient presents with   Jaquan Lockwood, Dr. Shilpi Polk, Meniscectomy (Left). Surgery 2/7/22. Allergies   Allergen Reactions    Vicodin [Hydrocodone-Acetaminophen] Nausea Only     sick     Outpatient Medications Marked as Taking for the 1/31/22 encounter (Office Visit) with Henriette Frankel Skorvanek, MAHI - CNP   Medication Sig Dispense Refill    ibuprofen (ADVIL;MOTRIN) 800 MG tablet TAKE 1 TABLET BY MOUTH  EVERY 6 HOURS AS NEEDED FOR PAIN 360 tablet 3       This patient presents to the office today for a preoperative consultation at the request of surgeon, Dr. Shilpi Polk, who plans on performing Arthroscopic partial medial and partial lateral meniscectomy of left knee on February 7 at [facility]. The current problem began 6 months ago, and symptoms have been stable with time. Conservative therapy: Yes: Physical therapy , which has been ineffective. .    Planned anesthesia: Choice with adductor canal block   Known anesthesia problems: none  Bleeding risk: No recent or remote history of abnormal bleeding  Personal or FH of DVT/PE: No    Patient objection to receiving blood products: No    Patient Active Problem List   Diagnosis    Heel spur    Hypertriglyceridemia    Other male erectile dysfunction    Preop testing       Past Medical History:   Diagnosis Date    Anxiety     Depression     Erectile dysfunction     Generalized anxiety disorder     Hypertriglyceridemia 2/4/2019     Past Surgical History:   Procedure Laterality Date    CARPAL TUNNEL RELEASE      WISDOM TOOTH EXTRACTION Bilateral      Family History   Problem Relation Age of Onset    Diabetes Other     Heart Disease Father     High Blood Pressure Father     High Cholesterol Father      Social History     Socioeconomic History    Marital status:      Spouse name: Not on file    Number of children: Not on file    Years of education: Not on file    Highest education level: Not on file   Occupational History    Not on file   Tobacco Use    Smoking status: Never Smoker    Smokeless tobacco: Current User   Substance and Sexual Activity    Alcohol use: Yes     Comment: occasional    Drug use: No    Sexual activity: Not on file   Other Topics Concern    Not on file   Social History Narrative    Not on file     Social Determinants of Health     Financial Resource Strain: Low Risk     Difficulty of Paying Living Expenses: Not very hard   Food Insecurity: No Food Insecurity    Worried About Running Out of Food in the Last Year: Never true    Que of Food in the Last Year: Never true   Transportation Needs: No Transportation Needs    Lack of Transportation (Medical): No    Lack of Transportation (Non-Medical):  No   Physical Activity:     Days of Exercise per Week: Not on file    Minutes of Exercise per Session: Not on file   Stress:     Feeling of Stress : Not on file   Social Connections:     Frequency of Communication with Friends and Family: Not on file    Frequency of Social Gatherings with Friends and Family: Not on file    Attends Hindu Services: Not on file    Active Member of Clubs or Organizations: Not on file    Attends Club or Organization Meetings: Not on file    Marital Status: Not on file   Intimate Partner Violence:     Fear of Current or Ex-Partner: Not on file    Emotionally Abused: Not on file    Physically Abused: Not on file    Sexually Abused: Not on file   Housing Stability:     Unable to Pay for Housing in the [place] 01/06/2022 8.8     WBC 01/06/2022 3.0*    RBC 01/06/2022 5.51     Hemoglobin 01/06/2022 15.4     Hematocrit 01/06/2022 46.8     MCV 01/06/2022 84.9     MCH 01/06/2022 28.0     MCHC 01/06/2022 33.0     RDW 01/06/2022 14.7*    Platelets 65/72/6936 212     PLATELET SLIDE REVIEW 01/06/2022 Normal     Path Consult 01/06/2022 Yes     Neutrophils % 01/06/2022 54.0     Lymphocytes % 01/06/2022 31.0     Monocytes % 01/06/2022 6.8     Eosinophils % 01/06/2022 1.0     Basophils % 01/06/2022 1.0     Neutrophils Absolute 01/06/2022 1.6     Lymphocytes Absolute 01/06/2022 1.1     Monocytes Absolute 01/06/2022 0.2     Eosinophils Absolute 01/06/2022 0.0     Basophils Absolute 01/06/2022 0.0     Atypical Lymphocytes Rel* 01/06/2022 7     RBC Morphology 01/06/2022 Normal     Path Consult 01/06/2022 Reviewed    Office Visit on 01/06/2022   Component Date Value    SARS-COV-2, POC 01/06/2022 Detected*    Lot Number 01/06/2022 [id number]     QC Pass/Fail 01/06/2022 P     Performing Instrument 01/06/2022 BD Veritor            Assessment:       51 y.o. patient with planned surgery as above  Known risk factors for perioperative complications: ChewingTobacco abuse  Current medications which may produce withdrawal symptoms if withheld perioperatively: none      Plan:     1. Preoperative workup as follows: covid  2. Change in medication regimen before surgery: Hold all medications on morning of surgery, Discontinue NSAIDs (Ibuprofen) 7 days before surgery  3.  Prophylaxis for cardiac events with perioperative beta-blockers: Not indicated  ACC/AHA indications for pre-operative beta-blocker use:    · Vascular surgery with history of postitive stress test  · Intermediate or high risk surgery with history of CAD   · Intermediate or high risk surgery with multiple clinical predictors of CAD- 2 of the following: history of compensated or prior heart failure, history of cerebrovascular disease, DM, or renal insufficiency    Routine administration of higher-dose, long-acting metoprolol in beta-blockernaïve patients on the day of surgery, and in the absence of dose titration is associated with an overall increase in mortality. Beta-blockers should be started days to weeks prior to surgery and titrated to pulse < 70.  4. Deep vein thrombosis prophylaxis: knee high SCD  5.  Please review the above to proceed with surgery

## 2022-02-05 PROBLEM — Z01.818 PREOP TESTING: Status: RESOLVED | Noted: 2022-01-06 | Resolved: 2022-02-05

## 2022-02-07 ENCOUNTER — HOSPITAL ENCOUNTER (OUTPATIENT)
Age: 47
Setting detail: OUTPATIENT SURGERY
Discharge: HOME | End: 2022-02-07
Attending: ORTHOPAEDIC SURGERY | Admitting: ORTHOPAEDIC SURGERY
Payer: COMMERCIAL

## 2022-02-07 ENCOUNTER — ANESTHESIA (OUTPATIENT)
Dept: OPERATING ROOM | Age: 47
End: 2022-02-07
Payer: COMMERCIAL

## 2022-02-07 ENCOUNTER — ANESTHESIA EVENT (OUTPATIENT)
Dept: OPERATING ROOM | Age: 47
End: 2022-02-07
Payer: COMMERCIAL

## 2022-02-07 VITALS — DIASTOLIC BLOOD PRESSURE: 88 MMHG | TEMPERATURE: 95.5 F | SYSTOLIC BLOOD PRESSURE: 157 MMHG | OXYGEN SATURATION: 81 %

## 2022-02-07 VITALS
TEMPERATURE: 97.8 F | RESPIRATION RATE: 14 BRPM | WEIGHT: 315 LBS | HEIGHT: 72 IN | OXYGEN SATURATION: 98 % | SYSTOLIC BLOOD PRESSURE: 143 MMHG | HEART RATE: 85 BPM | DIASTOLIC BLOOD PRESSURE: 73 MMHG | BODY MASS INDEX: 42.66 KG/M2

## 2022-02-07 DIAGNOSIS — M23.201 OLD TEAR OF LATERAL MENISCUS OF LEFT KNEE, UNSPECIFIED TEAR TYPE: Primary | ICD-10-CM

## 2022-02-07 DIAGNOSIS — M23.204 OLD TEAR OF MEDIAL MENISCUS OF LEFT KNEE, UNSPECIFIED TEAR TYPE: ICD-10-CM

## 2022-02-07 PROCEDURE — 3700000000 HC ANESTHESIA ATTENDED CARE: Performed by: ORTHOPAEDIC SURGERY

## 2022-02-07 PROCEDURE — 2500000003 HC RX 250 WO HCPCS: Performed by: ORTHOPAEDIC SURGERY

## 2022-02-07 PROCEDURE — 2580000003 HC RX 258: Performed by: ANESTHESIOLOGY

## 2022-02-07 PROCEDURE — 2580000003 HC RX 258: Performed by: ORTHOPAEDIC SURGERY

## 2022-02-07 PROCEDURE — 6360000002 HC RX W HCPCS: Performed by: NURSE ANESTHETIST, CERTIFIED REGISTERED

## 2022-02-07 PROCEDURE — 7100000001 HC PACU RECOVERY - ADDTL 15 MIN: Performed by: ORTHOPAEDIC SURGERY

## 2022-02-07 PROCEDURE — 3700000001 HC ADD 15 MINUTES (ANESTHESIA): Performed by: ORTHOPAEDIC SURGERY

## 2022-02-07 PROCEDURE — 3600000004 HC SURGERY LEVEL 4 BASE: Performed by: ORTHOPAEDIC SURGERY

## 2022-02-07 PROCEDURE — 6370000000 HC RX 637 (ALT 250 FOR IP): Performed by: STUDENT IN AN ORGANIZED HEALTH CARE EDUCATION/TRAINING PROGRAM

## 2022-02-07 PROCEDURE — 6360000002 HC RX W HCPCS: Performed by: STUDENT IN AN ORGANIZED HEALTH CARE EDUCATION/TRAINING PROGRAM

## 2022-02-07 PROCEDURE — 6360000002 HC RX W HCPCS: Performed by: ORTHOPAEDIC SURGERY

## 2022-02-07 PROCEDURE — 2500000003 HC RX 250 WO HCPCS: Performed by: STUDENT IN AN ORGANIZED HEALTH CARE EDUCATION/TRAINING PROGRAM

## 2022-02-07 PROCEDURE — 64447 NJX AA&/STRD FEMORAL NRV IMG: CPT | Performed by: STUDENT IN AN ORGANIZED HEALTH CARE EDUCATION/TRAINING PROGRAM

## 2022-02-07 PROCEDURE — 7100000000 HC PACU RECOVERY - FIRST 15 MIN: Performed by: ORTHOPAEDIC SURGERY

## 2022-02-07 PROCEDURE — 2709999900 HC NON-CHARGEABLE SUPPLY: Performed by: ORTHOPAEDIC SURGERY

## 2022-02-07 PROCEDURE — 3600000014 HC SURGERY LEVEL 4 ADDTL 15MIN: Performed by: ORTHOPAEDIC SURGERY

## 2022-02-07 PROCEDURE — 29880 ARTHRS KNE SRG MNISECTMY M&L: CPT | Performed by: ORTHOPAEDIC SURGERY

## 2022-02-07 RX ORDER — SODIUM CHLORIDE 9 MG/ML
25 INJECTION, SOLUTION INTRAVENOUS PRN
Status: DISCONTINUED | OUTPATIENT
Start: 2022-02-07 | End: 2022-02-07 | Stop reason: HOSPADM

## 2022-02-07 RX ORDER — OXYCODONE HYDROCHLORIDE AND ACETAMINOPHEN 5; 325 MG/1; MG/1
2 TABLET ORAL PRN
Status: COMPLETED | OUTPATIENT
Start: 2022-02-07 | End: 2022-02-07

## 2022-02-07 RX ORDER — FENTANYL CITRATE 50 UG/ML
50 INJECTION, SOLUTION INTRAMUSCULAR; INTRAVENOUS EVERY 5 MIN PRN
Status: DISCONTINUED | OUTPATIENT
Start: 2022-02-07 | End: 2022-02-07 | Stop reason: HOSPADM

## 2022-02-07 RX ORDER — HYDRALAZINE HYDROCHLORIDE 20 MG/ML
5 INJECTION INTRAMUSCULAR; INTRAVENOUS EVERY 10 MIN PRN
Status: DISCONTINUED | OUTPATIENT
Start: 2022-02-07 | End: 2022-02-07 | Stop reason: HOSPADM

## 2022-02-07 RX ORDER — SODIUM CHLORIDE 0.9 % (FLUSH) 0.9 %
10 SYRINGE (ML) INJECTION PRN
Status: DISCONTINUED | OUTPATIENT
Start: 2022-02-07 | End: 2022-02-07 | Stop reason: HOSPADM

## 2022-02-07 RX ORDER — ONDANSETRON 2 MG/ML
INJECTION INTRAMUSCULAR; INTRAVENOUS PRN
Status: DISCONTINUED | OUTPATIENT
Start: 2022-02-07 | End: 2022-02-07 | Stop reason: SDUPTHER

## 2022-02-07 RX ORDER — MEPERIDINE HYDROCHLORIDE 25 MG/ML
12.5 INJECTION INTRAMUSCULAR; INTRAVENOUS; SUBCUTANEOUS EVERY 5 MIN PRN
Status: DISCONTINUED | OUTPATIENT
Start: 2022-02-07 | End: 2022-02-07 | Stop reason: HOSPADM

## 2022-02-07 RX ORDER — BUPIVACAINE HYDROCHLORIDE 2.5 MG/ML
INJECTION, SOLUTION EPIDURAL; INFILTRATION; INTRACAUDAL PRN
Status: DISCONTINUED | OUTPATIENT
Start: 2022-02-07 | End: 2022-02-07 | Stop reason: SDUPTHER

## 2022-02-07 RX ORDER — SODIUM CHLORIDE, SODIUM LACTATE, POTASSIUM CHLORIDE, CALCIUM CHLORIDE 600; 310; 30; 20 MG/100ML; MG/100ML; MG/100ML; MG/100ML
INJECTION, SOLUTION INTRAVENOUS CONTINUOUS
Status: DISCONTINUED | OUTPATIENT
Start: 2022-02-07 | End: 2022-02-07 | Stop reason: HOSPADM

## 2022-02-07 RX ORDER — PROCHLORPERAZINE EDISYLATE 5 MG/ML
5 INJECTION INTRAMUSCULAR; INTRAVENOUS
Status: DISCONTINUED | OUTPATIENT
Start: 2022-02-07 | End: 2022-02-07 | Stop reason: HOSPADM

## 2022-02-07 RX ORDER — OXYCODONE HYDROCHLORIDE 5 MG/1
5 TABLET ORAL EVERY 6 HOURS PRN
Qty: 20 TABLET | Refills: 0 | Status: SHIPPED | OUTPATIENT
Start: 2022-02-07 | End: 2022-02-12

## 2022-02-07 RX ORDER — MIDAZOLAM HYDROCHLORIDE 1 MG/ML
INJECTION INTRAMUSCULAR; INTRAVENOUS PRN
Status: DISCONTINUED | OUTPATIENT
Start: 2022-02-07 | End: 2022-02-07 | Stop reason: SDUPTHER

## 2022-02-07 RX ORDER — OXYCODONE HYDROCHLORIDE 5 MG/1
5 TABLET ORAL EVERY 4 HOURS PRN
Status: DISCONTINUED | OUTPATIENT
Start: 2022-02-07 | End: 2022-02-07 | Stop reason: HOSPADM

## 2022-02-07 RX ORDER — MAGNESIUM HYDROXIDE 1200 MG/15ML
LIQUID ORAL CONTINUOUS PRN
Status: COMPLETED | OUTPATIENT
Start: 2022-02-07 | End: 2022-02-07

## 2022-02-07 RX ORDER — DIPHENHYDRAMINE HYDROCHLORIDE 50 MG/ML
12.5 INJECTION INTRAMUSCULAR; INTRAVENOUS
Status: DISCONTINUED | OUTPATIENT
Start: 2022-02-07 | End: 2022-02-07 | Stop reason: HOSPADM

## 2022-02-07 RX ORDER — LIDOCAINE HYDROCHLORIDE 20 MG/ML
INJECTION, SOLUTION EPIDURAL; INFILTRATION; INTRACAUDAL; PERINEURAL PRN
Status: DISCONTINUED | OUTPATIENT
Start: 2022-02-07 | End: 2022-02-07 | Stop reason: ALTCHOICE

## 2022-02-07 RX ORDER — OXYCODONE HYDROCHLORIDE AND ACETAMINOPHEN 5; 325 MG/1; MG/1
1 TABLET ORAL PRN
Status: COMPLETED | OUTPATIENT
Start: 2022-02-07 | End: 2022-02-07

## 2022-02-07 RX ORDER — LABETALOL HYDROCHLORIDE 5 MG/ML
5 INJECTION, SOLUTION INTRAVENOUS EVERY 10 MIN PRN
Status: DISCONTINUED | OUTPATIENT
Start: 2022-02-07 | End: 2022-02-07 | Stop reason: HOSPADM

## 2022-02-07 RX ORDER — FENTANYL CITRATE 50 UG/ML
25 INJECTION, SOLUTION INTRAMUSCULAR; INTRAVENOUS EVERY 5 MIN PRN
Status: DISCONTINUED | OUTPATIENT
Start: 2022-02-07 | End: 2022-02-07 | Stop reason: HOSPADM

## 2022-02-07 RX ORDER — MORPHINE SULFATE 4 MG/ML
4 INJECTION, SOLUTION INTRAMUSCULAR; INTRAVENOUS
Status: DISCONTINUED | OUTPATIENT
Start: 2022-02-07 | End: 2022-02-07 | Stop reason: HOSPADM

## 2022-02-07 RX ORDER — PROPOFOL 10 MG/ML
INJECTION, EMULSION INTRAVENOUS PRN
Status: DISCONTINUED | OUTPATIENT
Start: 2022-02-07 | End: 2022-02-07 | Stop reason: SDUPTHER

## 2022-02-07 RX ORDER — MORPHINE SULFATE 2 MG/ML
2 INJECTION, SOLUTION INTRAMUSCULAR; INTRAVENOUS
Status: DISCONTINUED | OUTPATIENT
Start: 2022-02-07 | End: 2022-02-07 | Stop reason: HOSPADM

## 2022-02-07 RX ORDER — ONDANSETRON 2 MG/ML
4 INJECTION INTRAMUSCULAR; INTRAVENOUS
Status: DISCONTINUED | OUTPATIENT
Start: 2022-02-07 | End: 2022-02-07 | Stop reason: HOSPADM

## 2022-02-07 RX ORDER — FENTANYL CITRATE 50 UG/ML
INJECTION, SOLUTION INTRAMUSCULAR; INTRAVENOUS PRN
Status: DISCONTINUED | OUTPATIENT
Start: 2022-02-07 | End: 2022-02-07 | Stop reason: SDUPTHER

## 2022-02-07 RX ORDER — SODIUM CHLORIDE 0.9 % (FLUSH) 0.9 %
10 SYRINGE (ML) INJECTION EVERY 12 HOURS SCHEDULED
Status: DISCONTINUED | OUTPATIENT
Start: 2022-02-07 | End: 2022-02-07 | Stop reason: HOSPADM

## 2022-02-07 RX ADMIN — FENTANYL CITRATE 50 MCG: 50 INJECTION, SOLUTION INTRAMUSCULAR; INTRAVENOUS at 17:11

## 2022-02-07 RX ADMIN — PROPOFOL 50 MG: 10 INJECTION, EMULSION INTRAVENOUS at 15:56

## 2022-02-07 RX ADMIN — PROPOFOL 300 MG: 10 INJECTION, EMULSION INTRAVENOUS at 15:52

## 2022-02-07 RX ADMIN — PROPOFOL 50 MG: 10 INJECTION, EMULSION INTRAVENOUS at 15:55

## 2022-02-07 RX ADMIN — FENTANYL CITRATE 25 MCG: 50 INJECTION, SOLUTION INTRAMUSCULAR; INTRAVENOUS at 15:58

## 2022-02-07 RX ADMIN — FENTANYL CITRATE 25 MCG: 50 INJECTION, SOLUTION INTRAMUSCULAR; INTRAVENOUS at 16:04

## 2022-02-07 RX ADMIN — SODIUM CHLORIDE, POTASSIUM CHLORIDE, SODIUM LACTATE AND CALCIUM CHLORIDE: 600; 310; 30; 20 INJECTION, SOLUTION INTRAVENOUS at 17:01

## 2022-02-07 RX ADMIN — FENTANYL CITRATE 25 MCG: 50 INJECTION, SOLUTION INTRAMUSCULAR; INTRAVENOUS at 16:20

## 2022-02-07 RX ADMIN — SODIUM CHLORIDE, POTASSIUM CHLORIDE, SODIUM LACTATE AND CALCIUM CHLORIDE: 600; 310; 30; 20 INJECTION, SOLUTION INTRAVENOUS at 14:02

## 2022-02-07 RX ADMIN — Medication 3000 MG: at 15:58

## 2022-02-07 RX ADMIN — MIDAZOLAM HYDROCHLORIDE 2 MG: 1 INJECTION, SOLUTION INTRAMUSCULAR; INTRAVENOUS at 14:29

## 2022-02-07 RX ADMIN — MIDAZOLAM HYDROCHLORIDE 2 MG: 1 INJECTION, SOLUTION INTRAMUSCULAR; INTRAVENOUS at 15:46

## 2022-02-07 RX ADMIN — FENTANYL CITRATE 25 MCG: 50 INJECTION, SOLUTION INTRAMUSCULAR; INTRAVENOUS at 16:33

## 2022-02-07 RX ADMIN — BUPIVACAINE HYDROCHLORIDE 25 ML: 2.5 INJECTION, SOLUTION EPIDURAL; INFILTRATION; INTRACAUDAL; PERINEURAL at 14:33

## 2022-02-07 RX ADMIN — ONDANSETRON 4 MG: 2 INJECTION INTRAMUSCULAR; INTRAVENOUS at 16:37

## 2022-02-07 RX ADMIN — OXYCODONE HYDROCHLORIDE AND ACETAMINOPHEN 2 TABLET: 5; 325 TABLET ORAL at 18:49

## 2022-02-07 ASSESSMENT — PULMONARY FUNCTION TESTS
PIF_VALUE: 16
PIF_VALUE: 15
PIF_VALUE: 19
PIF_VALUE: 15
PIF_VALUE: 16
PIF_VALUE: 19
PIF_VALUE: 15
PIF_VALUE: 15
PIF_VALUE: 0
PIF_VALUE: 16
PIF_VALUE: 16
PIF_VALUE: 15
PIF_VALUE: 16
PIF_VALUE: 20
PIF_VALUE: 16
PIF_VALUE: 18
PIF_VALUE: 16
PIF_VALUE: 1
PIF_VALUE: 16
PIF_VALUE: 2
PIF_VALUE: 15
PIF_VALUE: 16
PIF_VALUE: 16
PIF_VALUE: 2
PIF_VALUE: 16
PIF_VALUE: 12
PIF_VALUE: 16
PIF_VALUE: 16
PIF_VALUE: 15
PIF_VALUE: 16
PIF_VALUE: 15
PIF_VALUE: 16
PIF_VALUE: 18
PIF_VALUE: 0
PIF_VALUE: 3
PIF_VALUE: 16
PIF_VALUE: 18
PIF_VALUE: 16
PIF_VALUE: 17
PIF_VALUE: 19
PIF_VALUE: 17
PIF_VALUE: 16
PIF_VALUE: 2
PIF_VALUE: 16
PIF_VALUE: 3
PIF_VALUE: 16
PIF_VALUE: 3
PIF_VALUE: 15
PIF_VALUE: 16
PIF_VALUE: 19
PIF_VALUE: 16
PIF_VALUE: 1
PIF_VALUE: 19
PIF_VALUE: 16
PIF_VALUE: 16
PIF_VALUE: 1
PIF_VALUE: 19
PIF_VALUE: 19
PIF_VALUE: 7
PIF_VALUE: 3
PIF_VALUE: 16
PIF_VALUE: 19
PIF_VALUE: 1
PIF_VALUE: 16
PIF_VALUE: 16
PIF_VALUE: 18
PIF_VALUE: 16
PIF_VALUE: 16
PIF_VALUE: 1
PIF_VALUE: 15

## 2022-02-07 ASSESSMENT — PAIN SCALES - GENERAL: PAINLEVEL_OUTOF10: 5

## 2022-02-07 ASSESSMENT — PAIN - FUNCTIONAL ASSESSMENT: PAIN_FUNCTIONAL_ASSESSMENT: 0-10

## 2022-02-07 NOTE — ANESTHESIA PRE PROCEDURE
Department of Anesthesiology  Preprocedure Note       Name:  Arleth Howell   Age:  51 y.o.  :  1975                                          MRN:  [id number]         Date:  2022      Surgeon: Maxi Rankin):  Guillermo Biswas MD    Procedure: Procedure(s):  ARTHROSCOPIC PARTIAL MEDIAL AND PARTIAL LATERAL MENISCECTOMY OF LEFT KNEE, CHOICE WITH ADDUCTOR CANAL BLOCK, PAT IN OFFICE #[id number]    Medications prior to admission:   Prior to Admission medications    Medication Sig Start Date End Date Taking? Authorizing Provider   ibuprofen (ADVIL;MOTRIN) 800 MG tablet TAKE 1 TABLET BY MOUTH  EVERY 6 HOURS AS NEEDED FOR PAIN 21   MAHI Bazzi CNP   sildenafil (VIAGRA) 100 MG tablet Take 1 tablet by mouth as needed for Erectile Dysfunction  Patient not taking: Reported on 2022   MAHI Bazzi CNP       Current medications:    Current Facility-Administered Medications   Medication Dose Route Frequency Provider Last Rate Last Admin    ceFAZolin (ANCEF) 3000 mg in dextrose 5 % 100 mL IVPB  3,000 mg IntraVENous On Call to  Araceli Little MD           Allergies:     Allergies   Allergen Reactions    Vicodin [Hydrocodone-Acetaminophen] Nausea Only     sick       Problem List:    Patient Active Problem List   Diagnosis Code    Heel spur M77.30    Hypertriglyceridemia E78.1    Other male erectile dysfunction N52.8       Past Medical History:        Diagnosis Date    Anxiety     Depression     Erectile dysfunction     Generalized anxiety disorder     Hypertriglyceridemia 2019       Past Surgical History:        Procedure Laterality Date    CARPAL TUNNEL RELEASE      WISDOM TOOTH EXTRACTION Bilateral        Social History:    Social History     Tobacco Use    Smoking status: Never Smoker    Smokeless tobacco: Current User   Substance Use Topics    Alcohol use: Yes     Comment: occasional                                Ready to quit: Not Answered  Counseling given: Not Answered      Vital Signs (Current):   Vitals:    02/07/22 1330   BP: 136/81   Pulse: 77   Resp: 18   Temp: 97.8 °F (36.6 °C)   TempSrc: Temporal   SpO2: 95%   Weight: (!) 335 lb (152 kg)   Height: 6' (1.829 m)                                              BP Readings from Last 3 Encounters:   02/07/22 136/81   01/31/22 132/78   01/06/22 132/82       NPO Status: Time of last liquid consumption: 0800 (said he had not even a tablespoon of tea)                        Time of last solid consumption: 0800 (said he had 2 very small chips)                        Date of last liquid consumption: 02/07/22                        Date of last solid food consumption: 02/07/22    BMI:   Wt Readings from Last 3 Encounters:   02/07/22 (!) 335 lb (152 kg)   01/31/22 (!) 337 lb (152.9 kg)   01/09/22 (!) 326 lb (147.9 kg)     Body mass index is 45.43 kg/m². CBC:   Lab Results   Component Value Date    WBC 3.0 01/06/2022    RBC 5.51 01/06/2022    HGB 15.4 01/06/2022    HCT 46.8 01/06/2022    MCV 84.9 01/06/2022    RDW 14.7 01/06/2022     01/06/2022       CMP:   Lab Results   Component Value Date     01/06/2022    K 3.7 01/06/2022     01/06/2022    CO2 28 01/06/2022    BUN 7 01/06/2022    CREATININE 1.18 01/06/2022    GFRAA >60.0 01/06/2022    LABGLOM >60.0 01/06/2022    GLUCOSE 147 01/06/2022    PROT 7.6 05/18/2021    CALCIUM 8.8 01/06/2022    BILITOT 0.4 05/18/2021    ALKPHOS 102 05/18/2021    AST 47 05/18/2021    ALT 49 05/18/2021       POC Tests: No results for input(s): POCGLU, POCNA, POCK, POCCL, POCBUN, POCHEMO, POCHCT in the last 72 hours.     Coags: No results found for: PROTIME, INR, APTT    HCG (If Applicable): No results found for: PREGTESTUR, PREGSERUM, HCG, HCGQUANT     ABGs: No results found for: PHART, PO2ART, PBY2DDX, UNB0ZMH, BEART, T5DWCBUG     Type & Screen (If Applicable):  No results found for: LABABO, LABRH    Drug/Infectious Status (If Applicable):  No results found for: HIV, HEPCAB    COVID-19 Screening (If Applicable):   Lab Results   Component Value Date    COVID19 Not Detected 01/31/2022           Anesthesia Evaluation    Airway: Mallampati: II  TM distance: >3 FB   Neck ROM: full  Mouth opening: > = 3 FB Dental: normal exam         Pulmonary:Negative Pulmonary ROS breath sounds clear to auscultation                             Cardiovascular:Negative CV ROS            Rhythm: regular                      Neuro/Psych:   (+) psychiatric history:            GI/Hepatic/Renal:   (+) morbid obesity          Endo/Other: Negative Endo/Other ROS                    Abdominal:             Vascular: negative vascular ROS. Other Findings:             Anesthesia Plan      general and regional     ASA 2     (LMA  US guided Saphenous nerve block)  Induction: intravenous. MIPS: Postoperative opioids intended and Prophylactic antiemetics administered. Anesthetic plan and risks discussed with patient.       Plan discussed with surgical team.    Attending anesthesiologist reviewed and agrees with Preprocedure content              Adonay Mora MD   2/7/2022

## 2022-02-07 NOTE — ANESTHESIA PROCEDURE NOTES
Peripheral Block    Patient location during procedure: pre-op  Start time: 2/7/2022 2:29 PM  End time: 2/7/2022 2:34 PM  Staffing  Performed: anesthesiologist   Anesthesiologist: Holly Abdullahi MD  Preanesthetic Checklist  Completed: patient identified, IV checked, site marked, risks and benefits discussed, surgical consent, monitors and equipment checked, pre-op evaluation, timeout performed, anesthesia consent given, oxygen available and patient being monitored  Peripheral Block  Patient position: supine  Prep: ChloraPrep  Patient monitoring: cardiac monitor, continuous pulse ox, frequent blood pressure checks and IV access  Block type: Saphenous  Laterality: left  Injection technique: single-shot  Guidance: nerve stimulator and ultrasound guided  Local infiltration: bupivacaine  Infiltration strength: 0.25 %  Dose: 25 mL  Provider prep: mask and sterile gloves (Sterile probe cover)  Local infiltration: bupivacaine  Needle  Needle type: combined needle/nerve stimulator   Needle gauge: 22 G  Needle length: 10 cm  Needle localization: anatomical landmarks and ultrasound guidance  Assessment  Injection assessment: negative aspiration for heme, no paresthesia on injection and local visualized surrounding nerve on ultrasound  Paresthesia pain: immediately resolved  Slow fractionated injection: yes  Hemodynamics: stable  Additional Notes  Ultrasound image printed and saved in patient chart.     Sterile probe cover used    Reason for block: post-op pain management and at surgeon's request

## 2022-02-07 NOTE — H&P
The H&P in the electronic medical record dated 1/31/2022, was personally reviewed. There are no interval changes that need to be made. Plan is to proceed with left knee arthroscopic partial medial and partial lateral meniscectomy as planned.

## 2022-02-07 NOTE — ANESTHESIA POSTPROCEDURE EVALUATION
Department of Anesthesiology  Postprocedure Note    Patient: Marylin Luna  MRN: [id number]  YOB: 1975  Date of evaluation: 2/7/2022  Time:  5:19 PM     Procedure Summary     Date: 02/07/22 Room / Location: [address]    Anesthesia Start: 0870 Anesthesia Stop: 5212    Procedure: ARTHROSCOPIC PARTIAL MEDIAL AND PARTIAL LATERAL MENISCECTOMY OF LEFT KNEE. PARTIAL SYNOVECTOMY. (Left ) Diagnosis: (ACUTE MEDIAL MENISCAL INJURY OF LEFT KNEE SEQUELA ACUTE LATERAL TEAR OF LEFT MENICUS, SEQUELA)    Surgeons: José Miguel Pierson MD Responsible Provider: Sarah Salvador MD    Anesthesia Type: general, regional ASA Status: 2          Anesthesia Type: general, regional    Judith Phase I: Judith Score: 6    Judith Phase II:      Last vitals: Reviewed and per EMR flowsheets.        Anesthesia Post Evaluation    Patient location during evaluation: bedside  Patient participation: complete - patient participated  Level of consciousness: awake and sleepy but conscious  Pain score: 0  Airway patency: patent  Nausea & Vomiting: no nausea and no vomiting  Complications: no  Cardiovascular status: blood pressure returned to baseline and hemodynamically stable  Respiratory status: acceptable  Hydration status: euvolemic

## 2022-02-07 NOTE — OP NOTE
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 PARTIAL SYNOVECTOMY. Also, distal medial femoral chondroplasty and medial tibial chondroplasty. Surgeon(s):  Deshaun Grossman MD    Assistant:   First Assistant: Jazmine Taylor    Anesthesia: Choice    Estimated Blood Loss (mL): Minimal    Complications: None    Specimens:   * No specimens in log *    Implants:  * No implants in log *      Drains: * No LDAs found *    Findings: The patient did not have a large tear of the medial or lateral meniscus. He had intermargin tearing of the medial and lateral meniscus at the junction of the bodies and posterior horns. Additionally, patient had diffuse Outerbridge 3 changes on the medial compartment of the knee as well as the patellofemoral articulation. Patient also had a very large pathologic plica. The synovial shelf was so large, that it was not only snapping over the medial femoral condyle, but it was also getting pinched between the tibia and the femur in extension. Detailed Description of Procedure:   Patient was brought to the operating room. After adequate induction of general anesthesia by anesthesiology patient was placed supine on the operating table. The left lower extremity was prepped and draped in sterile fashion with a ChloraPrep scrub. Anterior medial and anterior lateral working portals were established. The arthroscope was inserted into the anterolateral portal.  Visual inspection began on the undersurface of the patella.   The patient had diffuse Outerbridge 3 changes on the patella and the corresponding femoral sulcus. As the scope was being passed into the medial joint space, there was a very, very large synovial shelf that was encountered. It was snapping over the medial femoral condyle. It was a plica. But, when the knee flexed, the plica snapped over the femur, but then when the knee was extended, the plica was actually being pinched between the femur and the tibia. This extensive plica was taken down with a 4 oh shaver and rendered incompetent. Hemostasis was obtained with the electrocautery. The scope was then passed into the medial joint space. The patient had diffuse Outerbridge 3 changes on the distal medial femoral condyle and the medial tibial plateau. Utilizing a 4 oh shaver, a chondroplasty was performed of the distal medial femoral condyle and the medial tibia. A tear at the junction of the medial meniscal body and posterior horn was identified. It was delineated with the probe, and taken down with a 4 oh shaver. The remainder of the medial meniscus was probed both caudad and cephalad, and there was no further tearing that could be identified. This exercise was repeated 3 times. The scope was then passed into the intercondylar notch. The anterior cruciate ligament was visualized and it was intact. The scope was then passed into the lateral joint space. The cartilage on the distal lateral femoral condyle and the lateral tibial plateau revealed minimal degenerative changes. There was, however, a tear at the margin of the lateral meniscal body in the posterior horn. It went out approximately residential through the width of the meniscus. It was taken down with an upbiter, and it was then shaped with a 4 oh shaver. The remainder of the lateral meniscus was stable to probing both caudad and cephalad.   The patient had abundant synovitis laterally, and this was taken down with a 4 oh shaver, because it appeared that this synovium was being pinched between the femur and the tibia in extension. Once this was done, the lateral meniscus was probed both caudad and cephalad to additional times. No further tearing was identified. The scope was then passed into the lateral gutter and up into the patellofemoral articulation. The knee was irrigated out with normal saline from the pulse lavage. The scope was withdrawn. Local anesthetic, 0.25% Marcaine, 20 cc, were injected into the knee. The portals were then closed with 4-0 Monocryl and skin glue. A sterile dressing was applied patient was stable to the PACU. Postoperative management:  Patient is to be discharged home with crutches. He will be weightbearing as tolerated. We will put in a consult for physical therapy for him. This will be done to prevent the formation of arthrofibrosis. He will follow-up in the office in 2 weeks.     Electronically signed by Randy Apley, MD on 2/7/2022 at 5:04 PM

## 2022-02-13 ASSESSMENT — ENCOUNTER SYMPTOMS
WHEEZING: 1
SINUS PRESSURE: 1
NAUSEA: 0
DIARRHEA: 0
HEMOPTYSIS: 0
VOMITING: 0

## 2022-02-24 ENCOUNTER — OFFICE VISIT (OUTPATIENT)
Dept: ORTHOPEDIC SURGERY | Age: 47
End: 2022-02-24

## 2022-02-24 VITALS
BODY MASS INDEX: 42.66 KG/M2 | OXYGEN SATURATION: 98 % | HEART RATE: 81 BPM | WEIGHT: 315 LBS | TEMPERATURE: 97.5 F | HEIGHT: 72 IN

## 2022-02-24 DIAGNOSIS — M23.204 OLD TEAR OF MEDIAL MENISCUS OF LEFT KNEE, UNSPECIFIED TEAR TYPE: Primary | ICD-10-CM

## 2022-02-24 PROCEDURE — 99024 POSTOP FOLLOW-UP VISIT: CPT | Performed by: ORTHOPAEDIC SURGERY

## 2022-02-24 RX ORDER — MELOXICAM 15 MG/1
15 TABLET ORAL DAILY
Qty: 30 TABLET | Refills: 1 | Status: SHIPPED | OUTPATIENT
Start: 2022-02-24 | End: 2022-03-26

## 2022-02-24 NOTE — PROGRESS NOTES
Narrative Referring Provider:   Elham Hernandez  [facility] [address]    PCP:   MAHI Adame CNP    ============================  IMPRESSION/PLAN:  ============================  Natalie Smith is s/p left Partial medial partial lateral meniscectomy as well as a synovectomy and distal medial femoral and proximal tibial chondroplasty completed on 2022. IMPRESSION: At normal postoperative stage of recovery; he reports that the sharp pains/mechanical symptoms are gone. He has some low-grade aching. PLAN:  We are going to get him into physical therapy  Routine follow-up. Ice compression and elevation  Patient Reassurance: Normal post-operative course discussed with patient. Patient reassured and supported. All questions answered. Follow up 4 weeks no X-Rays Needed     Monica Luis presents today for a a routine 1st post-op visit        Status post op: Left knee arthroscopy    BMI: There is no height or weight on file to calculate BMI. Post-operative recovery was complicated by uneventful/none. Patient rates their condition as improving. Does the patient still experience pain? 2/10 pain, aching    Post Op discharge patient location: in home. Functional Assessment is as follows: is ready to begin outpatient PT. Functional difficulties: None. Pain Medication: Tylenol occasional Motrin  Currently Ambulating with: No assistive devices    =================================  EXAM: POST OP KNEE  =================================   left Post-Operative Knee    Ambulates with a limp favoring the left    SKIN: Appropriate postop appearance, No evidence of erythema, warmth, discharge or drainage and Incision clean/dry/intact. The suture was removed from the medial incision. Range of motion is 0 degrees in extension and    110 degrees of flexion. Extension La degrees    Pain with ROM: Yes with deeper flexion    There is Mild effusion.      Mal-alignment: No    Tender to the palpation of Medial femoral condyle and Medial joint line    Neurovascular Status: Sensation Intact, Moves foot and ankle up & down, 2+ dorsalis pedis and negative homans sign    Stability:Varus/Valgus- Yes, stable    Quad strength: improving    Imaging:   None    Provider:   Peter Echeverria MD

## 2022-02-24 NOTE — LETTER
[address] [facility]  [address]  Phone: [phone]  Fax: [phone]    Nirav Gray MD        February 24, 2022     Patient: Abdelrahman Tejada   YOB: 1975   Date of Visit: 2/24/2022       To Whom It May Concern: It is my medical opinion that Gavi Lopez is to remain out of work for 4 weeks due to left knee surgery. If you have any questions or concerns, please don't hesitate to call.     Sincerely,        Nirav Gray MD

## 2022-03-08 ENCOUNTER — HOSPITAL ENCOUNTER (OUTPATIENT)
Dept: PHYSICAL THERAPY | Age: 47
Setting detail: THERAPIES SERIES
Discharge: HOME | End: 2022-03-08
Payer: COMMERCIAL

## 2022-03-08 PROCEDURE — 97161 PT EVAL LOW COMPLEX 20 MIN: CPT | Performed by: PHYSICAL THERAPIST

## 2022-03-08 PROCEDURE — 97110 THERAPEUTIC EXERCISES: CPT | Performed by: PHYSICAL THERAPIST

## 2022-03-08 ASSESSMENT — PAIN DESCRIPTION - LOCATION: LOCATION: KNEE

## 2022-03-08 ASSESSMENT — PAIN SCALES - GENERAL: PAINLEVEL_OUTOF10: 4

## 2022-03-08 ASSESSMENT — PAIN DESCRIPTION - ORIENTATION: ORIENTATION: LEFT

## 2022-03-08 ASSESSMENT — PAIN DESCRIPTION - PAIN TYPE: TYPE: SURGICAL PAIN

## 2022-03-08 ASSESSMENT — PAIN - FUNCTIONAL ASSESSMENT: PAIN_FUNCTIONAL_ASSESSMENT: PREVENTS OR INTERFERES SOME ACTIVE ACTIVITIES AND ADLS

## 2022-03-08 ASSESSMENT — PAIN DESCRIPTION - DESCRIPTORS: DESCRIPTORS: ACHING

## 2022-03-08 ASSESSMENT — PAIN DESCRIPTION - ONSET: ONSET: ON-GOING

## 2022-03-08 ASSESSMENT — PAIN DESCRIPTION - FREQUENCY: FREQUENCY: CONTINUOUS

## 2022-03-08 ASSESSMENT — PAIN DESCRIPTION - PROGRESSION: CLINICAL_PROGRESSION: GRADUALLY IMPROVING

## 2022-03-08 NOTE — PROGRESS NOTES
[facility]  PHYSICAL THERAPY EVALUATION    Date: 3/8/2022  Patient Name: Natalie Smith       MRN: [id number]   Account: [de-identified]   : 1975  (51 y.o.)   Gender: male   Referring Practitioner: Janessa Chauhan. Diagnosis: S/P left knee surgery:  medial and lateral menisectomy partial on the left  Treatment Diagnosis: Painful left knee with decreased strength and range of motion  Additional Pertinent Hx: None noted on medical history form. Was seen in physical therapy last summer for knee pain. Other position/activity restrictions: No work for another 3 weeks    Past Medical History:  has a past medical history of Anxiety, Depression, Erectile dysfunction, Generalized anxiety disorder, and Hypertriglyceridemia. Past Surgical History:   has a past surgical history that includes [place] tooth extraction (Bilateral); Carpal tunnel release; and Knee arthroscopy (Left, 2022).     Vital Signs  Patient Currently in Pain: Yes   Pain Screening  Patient Currently in Pain: Yes  Pain Assessment  Pain Assessment: 0-10  Pain Level: 4  Patient's Stated Pain Goal: No pain  Pain Type: Surgical pain  Pain Location: Knee  Pain Orientation: Left  Pain Descriptors: Aching  Pain Frequency: Continuous  Pain Onset: On-going  Clinical Progression: Gradually improving  Functional Pain Assessment: Prevents or interferes some active activities and ADLs                Lives With: Alone  Type of Home: House  Home Layout: One level  Home Access: Stairs to enter with rails  Entrance Stairs - Number of Steps: 3  Bathroom Shower/Tub: Tub/Shower unit  Bathroom Toilet: Standard  Receives Help From: Family  ADL Assistance: Independent  Homemaking Assistance: Independent  Homemaking Responsibilities: Yes  Ambulation Assistance: Independent  Transfer Assistance: Independent  Active : Yes  Mode of Transportation: Truck  Occupation: Full time employment  Type of occupation:  [employer]        Subjective:  Subjective: Patient states he has been waiting for his surgery quite awhile and it was delayed a month because he had Covid. He finally had surgery on 2/7/2022. He was ordered physical therapy. The doctor has kept him off work for another 3 weeks. Comments: Patient was seen in physical therapy for his original injury which occured August 3/2021 while working as a  for [employer]    Objective:      Strength RLE  Strength RLE: WNL  Strength LLE  Strength LLE: Exception  L Hip Flexion: 4/5  L Hip Extension: 4/5  L Hip ABduction: 4/5  L Hip ADduction: 4/5  L Knee Flexion: 4/5  L Knee Extension: 4/5                AROM RLE (degrees)  RLE AROM: WNL     AROM LLE (degrees)  LLE AROM : Exceptions  L SLR: 0-50  L Hip Flexion 0-125: 0-90  L Hip Extension 0-10: 0-10  L Hip ABduction 0-45: 0-45  L Hip ADduction 0-10: 0-10  L Hip External Rotation 0-45: 0-30  L Hip Internal Rotation 0-45: 0-30  L Knee Flexion 0-145: 106  L Knee Extension 0: -10        Observation/Palpation  Posture: Good  Observation: Ambulates with a slight limp on the left. He is not using an assistive device. Scar: Well healed port incisions     Exercises:   Exercises  Exercise 1: Quad sets  Exercise 2: SLR  Exercise 3: heel slides in supine  Exercise 4: bike level 2 for 5 mnutes            *Indicates exercise,modality, or manual techniques to be initiated when appropriate  Assessment: Body structures, Functions, Activity limitations: Decreased ROM,Decreased strength,Decreased endurance (Unable to tolerate regular work duty)  Assessment: Problem List:  1. Bilateral tight hamstrings  2. Decreased active range of motion left knee  3. Pain left knee  4. Decreased strength of the left knee 5. LEFS score 56/80 indicating moderate disability  6. Unable to tolerate regular work duty.   Prognosis: Excellent  Discharge Recommendations: Continue to assess pending progress  Activity Tolerance: Patient Tolerated treatment well     Decision Making: Low Complexity  History: Medium:  morbidly obese  Exam: Low  Clinical Presentation: Low        Outcomes Score:      LEFS Total Score: 56  LEFS Disability Index: 20-39%       Plan  Frequency/Duration:  Plan  Times per week: 2  Plan weeks: 6  Current Treatment Recommendations: Strengthening,ROM,Stair training,Pain Management,Modalities,Gait Training,Manual Therapy - Joint Manipulation,Manual Therapy - Soft Tissue Mobilization,Home Exercise Program         Patient Education  New Education Provided: PT Education: Goals;PT Role;Plan of Care;Home Exercise Program  Patient Education: Given written instructions and instructed in quad sets, active range of motion    POST-PAIN     Pain Rating (0-10 pain scale):  4 /10  Location and pain description same as pre-treatment unless indicated. Action: [] NA  [] Call Physician  [x] Perform HEP  [] Meds as prescribed    Evaluation and patient rights have been reviewed and patient agrees with plan of care. Yes  [x]  No  []   Explain:       [place] Fall Risk Assessment  Risk Factor Scale  Score   History of Falls [] Yes  [x] No 25  0 0   Secondary Diagnosis [] Yes  [x] No 15  0 0   Ambulatory Aid [] Furniture  [] Crutches/cane/walker  [x] None/bedrest/wheelchair/nurse 30  15  0 0   IV/Heparin Lock [] Yes  [x] No 20  0 0   Gait/Transferring [] Impaired  [] Weak  [x] Normal/bedrest/immobile 20  10  0 0   Mental Status [] Forgets limitations  [x] Oriented to own ability 15  0 0      Total:      0     Based on the Assessment score: check the appropriate box.   [x]  No intervention needed   Low =   Score of 0-24  []  Use standard prevention interventions Moderate =  Score of 24-44   [] Discuss fall prevention strategies   [] Indicate moderate falls risk on eval  []  Use high risk prevention interventions High = Score of 45 and higher   [] Discuss fall prevention strategies   [] Provide supervision during treatment time    Goals  Short term goals  Time Frame for Short term goals: 3-6 treatments  Short term goal 1: Active flexion of the left knee to 116 degrees  Short term goal 2: Pain decreased to 3/10  Short term goal 3: Hamstring length:  SLR 0-60 left  Long term goals  Time Frame for Long term goals : 7-24  Long term goal 1: Active range of motion of the left knee within normal limits  Long term goal 2: Strength of the left hip and knee 5/5  Long term goal 3: Pain 0/10 to 1/10 while walking  Long term goal 4: Patient will be able to tolerate regular work duty which includes stepping up on a stair 18 inches high  Long term goal 5: LEFS will be scored at discharge will be higher than 56/80         PT Individual Minutes  Time In: 1400  Time Out: 1430  Minutes: 30  Timed Code Treatment Minutes: 15 Minutes  Procedure Minutes:evaluation 15 minutes     Modality Time In Time Out Total Time Units    PT Evaluation: Low Complexity (67759) 1400 1415 15 1   PT Evaluation: Moderate Complexity (41999)       PT Evaluation:High Complexity (59723)       Ther ex (74642) 1415 1430 15 1   Manual Therapy (75297)       Neuro re-ed (38831)       Massage (33424)       Estim unattended   (67217)           Electronically signed by Piyush Hutson PT on 3/8/22 at 4:37 PM EST

## 2022-03-09 NOTE — PROGRESS NOTES
limitations: Decreased ROM,Decreased strength,Decreased endurance (Unable to tolerate regular work duty)  Assessment: Problem List:  1. Bilateral tight hamstrings  2. Decreased active range of motion left knee  3. Pain left knee  4. Decreased strength of the left knee 5. LEFS score 56/80 indicating moderate disability  6. Unable to tolerate regular work duty. Prognosis: Excellent  Discharge Recommendations: Continue to assess pending progress           PLAN: [x] Evaluate and Treat  Frequency/Duration:  Plan  Times per week: 2  Plan weeks: 6  Current Treatment Recommendations: Strengthening,ROM,Stair training,Pain Management,Modalities,Gait Training,Manual Therapy - Joint Manipulation,Manual Therapy - Soft Tissue Mobilization,Home Exercise Program     Precautions: Other position/activity restrictions: No work for another 3 weeks                  Patient Status:[x] Continue/ Initiate plan of Care    [] Discharge PT. Recommend pt continue with HEP. [] Additional visits requested, Please re-certify for additional visits:          Signature: Electronically signed by Buddy Raza PT on 3/9/22 at 9:07 AM EST      If you have any questions or concerns, please don't hesitate to call. Thank you for your referral.    I have reviewed this plan of care and certify a need for medically necessary rehabilitation services.     Physician Signature:__________________________________________________________  Date:  Please sign and return

## 2022-03-11 ENCOUNTER — HOSPITAL ENCOUNTER (OUTPATIENT)
Dept: PHYSICAL THERAPY | Age: 47
Setting detail: THERAPIES SERIES
Discharge: HOME | End: 2022-03-11
Payer: COMMERCIAL

## 2022-03-11 PROCEDURE — 97110 THERAPEUTIC EXERCISES: CPT | Performed by: PHYSICAL THERAPIST

## 2022-03-11 ASSESSMENT — PAIN DESCRIPTION - ORIENTATION: ORIENTATION: LEFT

## 2022-03-11 ASSESSMENT — PAIN DESCRIPTION - PAIN TYPE: TYPE: SURGICAL PAIN

## 2022-03-11 ASSESSMENT — PAIN SCALES - GENERAL: PAINLEVEL_OUTOF10: 2

## 2022-03-11 ASSESSMENT — PAIN DESCRIPTION - PROGRESSION: CLINICAL_PROGRESSION: GRADUALLY IMPROVING

## 2022-03-11 ASSESSMENT — PAIN - FUNCTIONAL ASSESSMENT: PAIN_FUNCTIONAL_ASSESSMENT: PREVENTS OR INTERFERES SOME ACTIVE ACTIVITIES AND ADLS

## 2022-03-11 ASSESSMENT — PAIN DESCRIPTION - LOCATION: LOCATION: KNEE

## 2022-03-11 ASSESSMENT — PAIN DESCRIPTION - DESCRIPTORS: DESCRIPTORS: ACHING

## 2022-03-11 ASSESSMENT — PAIN DESCRIPTION - FREQUENCY: FREQUENCY: CONTINUOUS

## 2022-03-11 NOTE — PROGRESS NOTES
[facility]  Outpatient Physical Therapy    Treatment Note        Date: 3/11/2022  Patient: Amandeep German  : 1975  ACCT #: [de-identified]  Referring Practitioner: Jacek Gil. Diagnosis: S/P left knee surgery:  medial and lateral menisectomy partial on the left  Treatment Diagnosis: Painful left knee with decreased strength and range of motion     Visit Information:  PT Visit Information  Onset Date: 22  PT Insurance Information: Jun[employer] [id number]  Total # of Visits Approved: 24  Total # of Visits to Date: 2  No Show: 0  Canceled Appointment: 0  Progress Note Counter:     Subjective: Patient states that he has been doing the exercises and his knee is sore.   Comments: Patient was seen in physical therapy for his original injury which occured August 3/2021 while working as a  for [employer] HEP Compliance:  [x] Good [] Fair [] Poor [] Reports not doing due to:    Vital Signs  Patient Currently in Pain: Yes   Pain Screening  Patient Currently in Pain: Yes  Pain Assessment  Pain Assessment: 0-10  Pain Level: 2  Patient's Stated Pain Goal: No pain  Pain Type: Surgical pain  Pain Location: Knee  Pain Orientation: Left  Pain Descriptors: Aching  Pain Frequency: Continuous  Clinical Progression: Gradually improving  Functional Pain Assessment: Prevents or interferes some active activities and ADLs    OBJECTIVE:   Exercises  Exercise 1: Quad sets  Exercise 2: SLR  2# 20 reps  Exercise 3: heel slides in supine 2# 20 reps  Exercise 4: bike level 2 for 5 mnutes  Exercise 5: Tke 2# 20 reps  Exercise 6: Hook lying ball squeeze 10 reps  Exercise 7: Sidelying abduction 2# 20 reps  Exercise 8: Bent knee fall out 20 reps with green band  Exercise 9: Standing ipsi/contralateral hip abduction and extension 20 reps  Exercise 10: Hamstring string stretch on step 3 each leg 30 second hold  Exercise 20: Circumferential measurements:  midpatella 52.5cm, Superior patella 58 cm, infrapatella 44.5 cm    Strength: [x] NT  [] MMT completed:     ROM: [x] NT  [] ROM measurements:        Modalities:  Modalities  Cryotherapy (Minutes\Location): Ice to left knee after exercise for pain control     *Indicates exercise, modality, or manual techniques to be initiated when appropriate    Assessment:   Activity Tolerance  Activity Tolerance: Patient Tolerated treatment well    Body structures, Functions, Activity limitations: Decreased ROM,Decreased strength,Decreased endurance  Assessment: Patient doing well today, able to increase his exercises and add 2# weights, and standing weightbearing exercises. Treatment Diagnosis: Painful left knee with decreased strength and range of motion  Prognosis: Excellent  PT Education: Home Exercise Program    Goals:  Short term goals  Time Frame for Short term goals: 3-6 treatments  Short term goal 1: Active flexion of the left knee to 116 degrees  Short term goal 2: Pain decreased to 3/10  Short term goal 3: Hamstring length:  SLR 0-60 left    Long term goals  Time Frame for Long term goals : 7-24  Long term goal 1: Active range of motion of the left knee within normal limits  Long term goal 2: Strength of the left hip and knee 5/5  Long term goal 3: Pain 0/10 to 1/10 while walking  Long term goal 4: Patient will be able to tolerate regular work duty which includes stepping up on a stair 18 inches high  Long term goal 5: LEFS will be scored at discharge will be higher than 56/80  Progress toward goals: good and on-going    POST-PAIN       Pain Rating (0-10 pain scale):  1 /10   Location and pain description same as pre-treatment unless indicated.    Action: [] NA   [x] Perform HEP  [] Meds as prescribed  [] Modalities as prescribed   [] Call Physician     Frequency/Duration:  Plan  Times per week: 2  Plan weeks: 6  Current Treatment Recommendations: Strengthening,ROM,Stair training,Pain Management,Modalities,Gait Training,Manual Therapy - Joint Manipulation,Manual Therapy - Soft Tissue Mobilization,Home Exercise Program     Pt to continue current HEP. See objective section for any therapeutic exercise changes, additions or modifications this date.          PT Individual Minutes  Time In: 1110  Time Out: 1150  Minutes: 40  Timed Code Treatment Minutes: 30 Minutes  Procedure Minutes:10 minutes cold pack     Modality Time In Time Out Total Minutes Units    Ther ex (18709) 1110 1140 30 2   Manual Therapy (53392)       Neuro re-ed (38617)       Massage (26013)       Estim unattended   (10751)         Signature:  Electronically signed by Piyush Hutson PT on 3/11/22 at 12:00 PM EST

## 2022-03-14 ENCOUNTER — HOSPITAL ENCOUNTER (OUTPATIENT)
Dept: PHYSICAL THERAPY | Age: 47
Setting detail: THERAPIES SERIES
Discharge: HOME | End: 2022-03-14
Payer: COMMERCIAL

## 2022-03-14 PROCEDURE — 97110 THERAPEUTIC EXERCISES: CPT

## 2022-03-14 ASSESSMENT — PAIN DESCRIPTION - PROGRESSION: CLINICAL_PROGRESSION: GRADUALLY IMPROVING

## 2022-03-14 NOTE — PROGRESS NOTES
[address] [facility]  Outpatient Physical Therapy    Treatment Note        Date: 3/14/2022  Patient: Oley Dubin  : 1975  ACCT #: [de-identified]  Referring Practitioner: Abena Badillo. Diagnosis: S/P left knee surgery:  medial and lateral menisectomy partial on the left  Treatment Diagnosis: Painful left knee with decreased strength and range of motion     Visit Information:  PT Visit Information  Onset Date: 22  PT Insurance Information: Juni [id number]  Total # of Visits Approved: 24  Total # of Visits to Date: 3  No Show: 0  Canceled Appointment: 0  Progress Note Counter: 3/24       Comments: Patient was seen in physical therapy for his original injury which occured August 3/2021 while working as a  for [employer] HEP Compliance:  [x] Good [] Fair [] Poor [] Reports not doing due to:    Vital Signs  Patient Currently in Pain: Yes   Pain Screening  Patient Currently in Pain: Yes  Pain Assessment  Clinical Progression: Gradually improving    OBJECTIVE:   Exercises  Exercise 2: SLR  2# 20 reps  Exercise 4: bike level 2 for 5 mnutes  Exercise 5: TKE  2# 20 reps  Exercise 6: Hook lying ball squeeze  reps  Exercise 7: Sidelying abduction 2# 20 reps  Exercise 8: Bent knee fall out 20 reps with green band  Exercise 10: Hamstring string stretch on step 3 each leg 30 second hold            Strength: [x] NT  [] MMT completed:     ROM: [] NT  [x] ROM measurements:           AROM LLE (degrees)  L Knee Flexion 0-145: 114  L Knee Extension 0: -5        Assessment: Body structures, Functions, Activity limitations: Decreased ROM,Decreased strength,Decreased endurance  Assessment: Continued with current program with no additions at this time as pt  remains challeged at current levels. Pt with improved AROM of Left knee in both flexion and Ext.   Treatment Diagnosis: Painful left knee with decreased strength and range of motion  Prognosis: Excellent Goals:  Short term goals  Time Frame for Short term goals: 3-6 treatments  Short term goal 1: Active flexion of the left knee to 116 degrees  Short term goal 2: Pain decreased to 3/10  Short term goal 3: Hamstring length:  SLR 0-60 left    Long term goals  Time Frame for Long term goals : 7-24  Long term goal 1: Active range of motion of the left knee within normal limits  Long term goal 2: Strength of the left hip and knee 5/5  Long term goal 3: Pain 0/10 to 1/10 while walking  Long term goal 4: Patient will be able to tolerate regular work duty which includes stepping up on a stair 18 inches high  Long term goal 5: LEFS will be scored at discharge will be higher than 56/80  Progress toward goals:improved AROM     POST-PAIN       Pain Rating (0-10 pain scale):   0/10   Location and pain description same as pre-treatment unless indicated. Action: [] NA   [x] Perform HEP  [] Meds as prescribed  [] Modalities as prescribed   [] Call Physician     Frequency/Duration:  Plan  Times per week: 2  Plan weeks: 6  Current Treatment Recommendations: Strengthening,ROM,Stair training,Pain Management,Modalities,Gait Training,Manual Therapy - Joint Manipulation,Manual Therapy - Soft Tissue Mobilization,Home Exercise Program     Pt to continue current HEP. See objective section for any therapeutic exercise changes, additions or modifications this date.          PT Individual Minutes  Time In: 8144  Time Out: 1525  Minutes: 40  Timed Code Treatment Minutes: 40 Minutes  Procedure Minutes:0   Modality Time In Time Out Total Minutes Units    Ther ex ([id number]) [id number] 3     Signature:  Electronically signed by Manford Kehr, PTA on 3/14/22 at 3:30 PM EDT

## 2022-03-16 ENCOUNTER — HOSPITAL ENCOUNTER (OUTPATIENT)
Dept: PHYSICAL THERAPY | Age: 47
Setting detail: THERAPIES SERIES
Discharge: HOME | End: 2022-03-16
Payer: COMMERCIAL

## 2022-03-16 PROCEDURE — 97110 THERAPEUTIC EXERCISES: CPT | Performed by: PHYSICAL THERAPIST

## 2022-03-16 ASSESSMENT — PAIN - FUNCTIONAL ASSESSMENT: PAIN_FUNCTIONAL_ASSESSMENT: PREVENTS OR INTERFERES SOME ACTIVE ACTIVITIES AND ADLS

## 2022-03-16 ASSESSMENT — PAIN DESCRIPTION - LOCATION: LOCATION: KNEE

## 2022-03-16 ASSESSMENT — PAIN SCALES - GENERAL: PAINLEVEL_OUTOF10: 6

## 2022-03-16 ASSESSMENT — PAIN DESCRIPTION - FREQUENCY: FREQUENCY: INTERMITTENT

## 2022-03-16 ASSESSMENT — PAIN DESCRIPTION - PROGRESSION: CLINICAL_PROGRESSION: GRADUALLY IMPROVING

## 2022-03-16 ASSESSMENT — PAIN DESCRIPTION - PAIN TYPE: TYPE: SURGICAL PAIN

## 2022-03-16 ASSESSMENT — PAIN DESCRIPTION - ORIENTATION: ORIENTATION: LEFT

## 2022-03-16 ASSESSMENT — PAIN DESCRIPTION - DESCRIPTORS: DESCRIPTORS: ACHING

## 2022-03-16 NOTE — PROGRESS NOTES
[address] [facility]  Outpatient Physical Therapy    Treatment Note        Date: 3/16/2022  Patient: Jus Hollingsworth  : 1975  ACCT #: [de-identified]  Referring Practitioner: Rissa Lane.   Diagnosis: S/P left knee surgery:  medial and lateral menisectomy partial on the left  Treatment Diagnosis: Painful left knee with decreased strength and range of motion     Visit Information:  PT Visit Information  Onset Date: 22  PT Insurance Information: Juni [id number]  Total # of Visits Approved: 24  Total # of Visits to Date: 4  No Show: 0  Canceled Appointment: 0  Progress Note Counter:     Subjective: Patient states he has a little more pain this morning, but he just got up     HEP Compliance:  [x] Good [] Fair [] Poor [] Reports not doing due to:    Vital Signs  Patient Currently in Pain: Yes   Pain Screening  Patient Currently in Pain: Yes  Pain Assessment  Pain Assessment: 0-10  Pain Level: 6  Patient's Stated Pain Goal: No pain  Pain Type: Surgical pain  Pain Location: Knee  Pain Orientation: Left  Pain Descriptors: Aching  Pain Frequency: Intermittent  Clinical Progression: Gradually improving  Functional Pain Assessment: Prevents or interferes some active activities and ADLs    OBJECTIVE:   Exercises  Exercise 2: SLR  3# 20 reps  Exercise 3: heel slides in supine 3# 20 reps  Exercise 4: bike level 2 for 6 mnutes  Exercise 5: TKE  3# 20 reps  Exercise 7: Sidelying abduction 2# 20 reps  Exercise 8: Bent knee fall out 20 reps with green band  Exercise 9: Standing ipsi/contralateral hip abduction and extension 20 reps with yellow  band  Exercise 11: Sitting knee flexion with green band 20 reps  Exercise 12: 6 inch step up and down forward/backward and laterally 20 reps       Strength: [x] NT  [] MMT completed:     ROM: [] NT  [x] ROM measurements:      AROM LLE (degrees)  L Knee Flexion 0-145: 114  L Knee Extension 0: -5         *Indicates exercise, modality, or manual techniques to be initiated when appropriate    Assessment:   Activity Tolerance  Activity Tolerance: Patient tolerated an increase in weights today. He also tolerated more weight bearing exercises today    Body structures, Functions, Activity limitations: Decreased ROM,Decreased strength,Decreased endurance  Assessment: Patient is progressing. He is able to tolerate more weights and weight bearing exerises. Pain level is slowly decreasing. Treatment Diagnosis: Painful left knee with decreased strength and range of motion  Prognosis: Excellent  PT Education: Home Exercise Program  Patient Education: Given written instructions and instructed in quad sets, active range of motion, and hamstring stretching    Goals:  Short term goals  Time Frame for Short term goals: 3-6 treatments  Short term goal 1: Active flexion of the left knee to 116 degrees  Short term goal 2: Pain decreased to 3/10  Short term goal 3: Hamstring length:  SLR 0-60 left    Long term goals  Time Frame for Long term goals : 7-24  Long term goal 1: Active range of motion of the left knee within normal limits  Long term goal 2: Strength of the left hip and knee 5/5  Long term goal 3: Pain 0/10 to 1/10 while walking  Long term goal 4: Patient will be able to tolerate regular work duty which includes stepping up on a stair 18 inches high  Long term goal 5: LEFS will be scored at discharge will be higher than 56/80  Progress toward goals: good and on-going    POST-PAIN       Pain Rating (0-10 pain scale):  2 /10   Location and pain description same as pre-treatment unless indicated.    Action: [] NA   [x] Perform HEP  [] Meds as prescribed  [] Modalities as prescribed   [] Call Physician     Frequency/Duration:  Plan  Times per week: 2  Plan weeks: 6  Current Treatment Recommendations: Strengthening,ROM,Stair training,Pain Management,Modalities,Gait Training,Manual Therapy - Joint Manipulation,Manual Therapy - Soft Tissue Mobilization,Home Exercise Program     Pt to continue current HEP. See objective section for any therapeutic exercise changes, additions or modifications this date.          PT Individual Minutes  Time In: 2803  Time Out: 0915  Minutes: 40  Timed Code Treatment Minutes: 40 Minutes  Procedure Minutes: 0     Modality Time In Time Out Total Minutes Units    Ther ex ([id number]) [id number] 3   Manual Therapy ([id number])       Neuro re-ed (53011)       Massage ([id number])       Estim unattended   ([id number])         Signature:  Electronically signed by Shaquille Braun PT on 3/16/22 at 9:30 AM EDT  Added 0 procedure minutes 4/21/2022 P

## 2022-03-18 ENCOUNTER — HOSPITAL ENCOUNTER (OUTPATIENT)
Dept: PHYSICAL THERAPY | Age: 47
Setting detail: THERAPIES SERIES
Discharge: HOME | End: 2022-03-18
Payer: COMMERCIAL

## 2022-03-18 PROCEDURE — 97110 THERAPEUTIC EXERCISES: CPT | Performed by: PHYSICAL THERAPIST

## 2022-03-18 ASSESSMENT — PAIN - FUNCTIONAL ASSESSMENT: PAIN_FUNCTIONAL_ASSESSMENT: PREVENTS OR INTERFERES SOME ACTIVE ACTIVITIES AND ADLS

## 2022-03-18 ASSESSMENT — PAIN DESCRIPTION - PAIN TYPE: TYPE: SURGICAL PAIN

## 2022-03-18 ASSESSMENT — PAIN DESCRIPTION - FREQUENCY: FREQUENCY: INTERMITTENT

## 2022-03-18 ASSESSMENT — PAIN DESCRIPTION - ORIENTATION: ORIENTATION: LEFT

## 2022-03-18 ASSESSMENT — PAIN SCALES - GENERAL: PAINLEVEL_OUTOF10: 3

## 2022-03-18 ASSESSMENT — PAIN DESCRIPTION - LOCATION: LOCATION: KNEE

## 2022-03-18 ASSESSMENT — PAIN DESCRIPTION - DESCRIPTORS: DESCRIPTORS: ACHING

## 2022-03-18 ASSESSMENT — PAIN DESCRIPTION - PROGRESSION: CLINICAL_PROGRESSION: GRADUALLY IMPROVING

## 2022-03-18 NOTE — PROGRESS NOTES
[address] [facility]  Outpatient Physical Therapy    Treatment Note        Date: 3/18/2022  Patient: Ralph Odell  : 1975  ACCT #: [de-identified]  Referring Practitioner: Machelle Benavidez. Diagnosis: S/P left knee surgery:  medial and lateral menisectomy partial on the left  Treatment Diagnosis: Painful left knee with decreased strength and range of motion     Visit Information:  PT Visit Information  Onset Date: 22  PT Insurance Information: Juni [id number]  Total # of Visits Approved: 24  Total # of Visits to Date: 5  No Show: 0  Canceled Appointment: 0  Progress Note Counter:     Subjective: Patient states that his pain is a 9/10 when he first gets up in the morning, then after he moves around it gets better. Comments: Discussed with patient about sleeping postures, and that this could be contributing to his pain.   HEP Compliance:  [x] Good [] Fair [] Poor [] Reports not doing due to:    Vital Signs  Patient Currently in Pain: Yes   Pain Screening  Patient Currently in Pain: Yes  Pain Assessment  Pain Assessment: 0-10  Pain Level: 3  Patient's Stated Pain Goal: No pain  Pain Type: Surgical pain  Pain Location: Knee  Pain Orientation: Left  Pain Descriptors: Aching  Pain Frequency: Intermittent  Clinical Progression: Gradually improving  Functional Pain Assessment: Prevents or interferes some active activities and ADLs    OBJECTIVE:   Exercises  Exercise 1: Quad sets  Exercise 2: SLR  3# 20 reps  Exercise 3: heel slides in supine 3# 20 reps  Exercise 4: bike level 2 for 6 mnutes  Exercise 5: TKE  3# 20 reps  Exercise 6: Hook lying ball squeeze  reps  Exercise 7: Sidelying abduction 2# 20 reps  Exercise 8: Bent knee fall out 20 reps with green band  Exercise 9: Standing ipsi/contralateral hip abduction and extension 20 reps with yellow  band  Exercise 10: Hamstring string stretch on step 3 each leg 30 second hold  Exercise 11: Sitting knee flexion with green band 20 reps Strength: [x] NT  [] MMT completed:        ROM: [] NT  [x] ROM measurements:      AROM LLE (degrees)  L Knee Flexion 0-145: 114  L Knee Extension 0: -5      Modalities  Cryotherapy (Minutes\Location): Ice to left knee after exercise for pain control     *Indicates exercise, modality, or manual techniques to be initiated when appropriate    Assessment:   Activity Tolerance  Activity Tolerance: Patient Tolerated treatment well    Body structures, Functions, Activity limitations: Decreased ROM,Decreased strength,Decreased endurance  Assessment: Patient is having pain early in the day. He was able to tolerate all exercises today. Treatment Diagnosis: Painful left knee with decreased strength and range of motion  Prognosis: Excellent  Patient Education: Given written instructions and instructed in quad sets, active range of motion, and hamstring stretching    Goals:  Short term goals  Time Frame for Short term goals: 3-6 treatments  Short term goal 1: Active flexion of the left knee to 116 degrees  Short term goal 2: Pain decreased to 3/10  Short term goal 3: Hamstring length:  SLR 0-60 left    Long term goals  Time Frame for Long term goals : 7-24  Long term goal 1: Active range of motion of the left knee within normal limits  Long term goal 2: Strength of the left hip and knee 5/5  Long term goal 3: Pain 0/10 to 1/10 while walking  Long term goal 4: Patient will be able to tolerate regular work duty which includes stepping up on a stair 18 inches high  Long term goal 5: LEFS will be scored at discharge will be higher than 56/80  Progress toward goals: good and on-going    POST-PAIN       Pain Rating (0-10 pain scale):   2/10   Location and pain description same as pre-treatment unless indicated.    Action: [] NA   [x] Perform HEP  [] Meds as prescribed  [] Modalities as prescribed   [] Call Physician     Frequency/Duration:  Plan  Times per week: 2  Plan weeks: 6  Current Treatment Recommendations: Strengthening,ROM,Stair training,Pain Eli Choi Therapy - Joint Manipulation,Manual Therapy - Soft Tissue Mobilization,Home Exercise Program     Pt to continue current HEP. See objective section for any therapeutic exercise changes, additions or modifications this date.          PT Individual Minutes  Time In: 0900  Time Out: 1823  Minutes: 45  Timed Code Treatment Minutes: 35 Minutes  Procedure Minutes:10 minutes cold pack     Modality Time In Time Out Total Minutes Units    Ther ex ([id number]) 0900 0935 35 2   Manual Therapy ([id number])       Neuro re-ed (85551)       Massage (22177)       Estim unattended   ([id number])         Signature:  Electronically signed by Umair Simms PT on 3/18/22 at 9:43 AM EDT

## 2022-03-21 ENCOUNTER — HOSPITAL ENCOUNTER (OUTPATIENT)
Dept: PHYSICAL THERAPY | Age: 47
Setting detail: THERAPIES SERIES
Discharge: HOME | End: 2022-03-21
Payer: COMMERCIAL

## 2022-03-21 PROCEDURE — 97110 THERAPEUTIC EXERCISES: CPT | Performed by: PHYSICAL THERAPIST

## 2022-03-21 ASSESSMENT — PAIN DESCRIPTION - PROGRESSION: CLINICAL_PROGRESSION: NOT CHANGED

## 2022-03-21 ASSESSMENT — PAIN DESCRIPTION - LOCATION: LOCATION: KNEE

## 2022-03-21 ASSESSMENT — PAIN - FUNCTIONAL ASSESSMENT: PAIN_FUNCTIONAL_ASSESSMENT: PREVENTS OR INTERFERES SOME ACTIVE ACTIVITIES AND ADLS

## 2022-03-21 ASSESSMENT — PAIN SCALES - GENERAL: PAINLEVEL_OUTOF10: 3

## 2022-03-21 ASSESSMENT — PAIN DESCRIPTION - FREQUENCY: FREQUENCY: INTERMITTENT

## 2022-03-21 ASSESSMENT — PAIN DESCRIPTION - ORIENTATION: ORIENTATION: LEFT

## 2022-03-21 ASSESSMENT — PAIN DESCRIPTION - DESCRIPTORS: DESCRIPTORS: ACHING

## 2022-03-21 ASSESSMENT — PAIN DESCRIPTION - PAIN TYPE: TYPE: SURGICAL PAIN

## 2022-03-21 NOTE — PROGRESS NOTES
[address] [facility]  Outpatient Physical Therapy    Treatment Note        Date: 3/21/2022  Patient: Amandeep German  : 1975  ACCT #: [de-identified]  Referring Practitioner: Jacek Gil. Diagnosis: S/P left knee surgery:  medial and lateral menisectomy partial on the left  Treatment Diagnosis: Painful left knee with decreased strength and range of motion     Visit Information:  PT Visit Information  Onset Date: 22  PT Insurance Information: Juni [id number]  Total # of Visits Approved: 24  Total # of Visits to Date: 6  No Show: 0  Canceled Appointment: 0  Progress Note Counter:     Subjective: Patient states that his pain of his knee in the morning is less severe     HEP Compliance:  [x] Good [] Fair [] Poor [] Reports not doing due to:    Vital Signs  Patient Currently in Pain: Yes   Pain Screening  Patient Currently in Pain: Yes  Pain Assessment  Pain Assessment: 0-10  Pain Level: 3  Patient's Stated Pain Goal: No pain  Pain Type: Surgical pain  Pain Location: Knee  Pain Orientation: Left  Pain Descriptors: Aching  Pain Frequency: Intermittent  Clinical Progression: Not changed  Functional Pain Assessment: Prevents or interferes some active activities and ADLs    OBJECTIVE:   Exercises  Exercise 1: Quad sets  Exercise 2: SLR  3# 20 reps  Exercise 3: heel slides in supine 3# 20 reps  Exercise 4: bike level 3 for 6 mnutes  Exercise 5: TKE  3# 20 reps  Exercise 6: Hook lying ball squeeze  reps  Exercise 7: Sidelying abduction 2# 20 reps  Exercise 8: Bent knee fall out 20 reps with green band  Exercise 9: Standing ipsi/contralateral hip abduction and extension 20 reps with red  band  Exercise 12: 6 inch step up and down forward/backward and laterally 20 reps  Exercise 13: Apollo leg extension and flexion 40# 20 reps  Strength: [x] NT  [] MMT completed:     ROM: [x] NT  [] ROM measurements:   :        Modalities:  Modalities  Cryotherapy (Minutes\Location):  Ice to left knee after exercise for pain control     *Indicates exercise, modality, or manual techniques to be initiated when appropriate    Assessment:   Activity Tolerance  Activity Tolerance: Patient Tolerated treatment well  Activity Tolerance: Patient tolerated an increase in weights today. He also tolerated more weight bearing exercises today    Body structures, Functions, Activity limitations: Decreased ROM,Decreased strength,Decreased endurance  Assessment: Patient is improving with all of his exercises. His pain level is decreasing. Treatment Diagnosis: Painful left knee with decreased strength and range of motion  Prognosis: Excellent  Patient Education: Given written instructions and instructed in quad sets, active range of motion, and hamstring stretching    Goals:  Short term goals  Time Frame for Short term goals: 3-6 treatments  Short term goal 1: Active flexion of the left knee to 116 degrees  Short term goal 2: Pain decreased to 3/10  (goal met 3/21/22)  Short term goal 3: Hamstring length:  SLR 0-60 left    Long term goals  Time Frame for Long term goals : 7-24  Long term goal 1: Active range of motion of the left knee within normal limits  Long term goal 2: Strength of the left hip and knee 5/5  Long term goal 3: Pain 0/10 to 1/10 while walking  Long term goal 4: Patient will be able to tolerate regular work duty which includes stepping up on a stair 18 inches high  Long term goal 5: LEFS will be scored at discharge will be higher than 56/80  Progress toward goals:Good and on-going  POST-PAIN       Pain Rating (0-10 pain scale):  2 /10   Location and pain description same as pre-treatment unless indicated.    Action: [] NA   [x] Perform HEP  [] Meds as prescribed  [] Modalities as prescribed   [] Call Physician     Frequency/Duration:  Plan  Times per week: 2  Plan weeks: 6  Current Treatment Recommendations: Strengthening,ROM,Stair training,Pain Management,Modalities,Gait Training,Manual Therapy - Joint Manipulation,Manual Therapy - Soft Tissue Mobilization,Home Exercise Program     Pt to continue current HEP. See objective section for any therapeutic exercise changes, additions or modifications this date.          PT Individual Minutes  Time In: 6640  Time Out: 0945  Minutes: 40  Timed Code Treatment Minutes: 30 Minutes  Procedure Minutes:10 minutes ice     Modality Time In Time Out Total Minutes Units    Ther ex ([id number]) [id number] 99 2   Manual Therapy ([id number])       Neuro re-ed (18310)       Massage (11859)       Estim unattended   ([id number])         Signature:  Electronically signed by Cassie Edouard, PT on 3/21/22 at 9:36 AM EDT

## 2022-03-23 ENCOUNTER — HOSPITAL ENCOUNTER (OUTPATIENT)
Dept: PHYSICAL THERAPY | Age: 47
Setting detail: THERAPIES SERIES
Discharge: HOME | End: 2022-03-23
Payer: COMMERCIAL

## 2022-03-23 PROCEDURE — G0283 ELEC STIM OTHER THAN WOUND: HCPCS | Performed by: PHYSICAL THERAPIST

## 2022-03-23 PROCEDURE — 97110 THERAPEUTIC EXERCISES: CPT | Performed by: PHYSICAL THERAPIST

## 2022-03-23 ASSESSMENT — PAIN DESCRIPTION - DESCRIPTORS: DESCRIPTORS: ACHING

## 2022-03-23 ASSESSMENT — PAIN DESCRIPTION - LOCATION: LOCATION: KNEE

## 2022-03-23 ASSESSMENT — PAIN - FUNCTIONAL ASSESSMENT: PAIN_FUNCTIONAL_ASSESSMENT: PREVENTS OR INTERFERES SOME ACTIVE ACTIVITIES AND ADLS

## 2022-03-23 ASSESSMENT — PAIN DESCRIPTION - PAIN TYPE: TYPE: SURGICAL PAIN

## 2022-03-23 ASSESSMENT — PAIN DESCRIPTION - FREQUENCY: FREQUENCY: CONTINUOUS

## 2022-03-23 ASSESSMENT — PAIN DESCRIPTION - ORIENTATION: ORIENTATION: LEFT

## 2022-03-23 ASSESSMENT — PAIN DESCRIPTION - PROGRESSION: CLINICAL_PROGRESSION: RAPIDLY WORSENING

## 2022-03-23 ASSESSMENT — PAIN SCALES - GENERAL: PAINLEVEL_OUTOF10: 6

## 2022-03-23 ASSESSMENT — PAIN DESCRIPTION - ONSET: ONSET: ON-GOING

## 2022-03-23 NOTE — PROGRESS NOTES
[address] [facility]  Outpatient Physical Therapy    Treatment Note        Date: 3/23/2022  Patient: Leslie Chan  : 1975  ACCT #: [de-identified]  Referring Practitioner: Belinda Gao. Diagnosis: S/P left knee surgery:  medial and lateral menisectomy partial on the left  Treatment Diagnosis: Painful left knee with decreased strength and range of motion     Visit Information:  PT Visit Information  Onset Date: 22  PT Insurance Information: Juni [id number]  Total # of Visits Approved: 24  Total # of Visits to Date: 7  No Show: 0  Canceled Appointment: 0  Progress Note Counter:     Subjective: Patient states that he accidently twisted his knee while he was weight bearing on it. It cause severe pain. He did ice it last night.   Continues with increased pain today     HEP Compliance:  [x] Good [] Fair [] Poor [] Reports not doing due to:    Vital Signs  Patient Currently in Pain: Yes   Pain Screening  Patient Currently in Pain: Yes  Pain Assessment  Pain Assessment: 0-10  Pain Level: 6  Patient's Stated Pain Goal: No pain  Pain Type: Surgical pain  Pain Location: Knee  Pain Orientation: Left  Pain Descriptors: Aching  Pain Frequency: Continuous  Pain Onset: On-going  Clinical Progression: Rapidly worsening  Functional Pain Assessment: Prevents or interferes some active activities and ADLs    OBJECTIVE:   Exercises  Exercise 1: Quad sets  Exercise 2: SLR  20 reps  Exercise 3: heel slides in supine 20 reps  Exercise 4: bike level 3 for 1 mnute  Exercise 5: TKE  20 reps  Exercise 6: Hook lying ball squeeze  reps  Exercise 7: Sidelying abduction 20 reps  Exercise 8: Bent knee fall out 20 reps with green band  Exercise 11: Sitting knee flexion with green band 20 reps  Exercise 20: Circumferential measurements midpatella 49.5 cm, superior patella 55.5 cm, inferior patella 44.5 cm          Strength: [x] NT  [] MMT completed:        ROM: [] NT  [x] ROM measurements:      AROM LLE (degrees)  L Hip Flexion 0-125: 0-90  L Hip Extension 0-10: 0-10  L Hip ABduction 0-45: 0-45  L Hip ADduction 0-10: 0-10  L Hip External Rotation 0-45: 0-30  L Hip Internal Rotation 0-45: 0-30  L Knee Flexion 0-145: 110  L Knee Extension 0: -5        Modalities:  Modalities  Cryotherapy (Minutes\Location): Ice to left knee after exercise for pain control  E-stim (parameters): Katelin cross pattern across left knee     *Indicates exercise, modality, or manual techniques to be initiated when appropriate    Assessment:   Activity Tolerance  Activity Tolerance: Patient did range of motion today, deferred weights today due to increase in pain       Assessment: Patient did twist his knee yesterday. He has increase in pain, but no increase in swelling. Treatment Diagnosis: Painful left knee with decreased strength and range of motion  Prognosis: Excellent  Patient Education: Given written instructions and instructed in quad sets, active range of motion, and hamstring stretching, walking    Goals:  Short term goals  Time Frame for Short term goals: 3-6 treatments  Short term goal 1: Active flexion of the left knee to 116 degrees  Short term goal 2: Pain decreased to 3/10  (goal met 3/21/22)  Short term goal 3: Hamstring length:  SLR 0-60 left    Long term goals  Time Frame for Long term goals : 7-24  Long term goal 2: Strength of the left hip and knee 5/5  Long term goal 3: Pain 0/10 to 1/10 while walking  Long term goal 4: Patient will be able to tolerate regular work duty which includes stepping up on a stair 18 inches high  Long term goal 5: LEFS will be scored at discharge will be higher than 56/80  Progress toward goals: good    POST-PAIN       Pain Rating (0-10 pain scale):  4/10   Location and pain description same as pre-treatment unless indicated.    Action: [] NA   [x] Perform HEP  [] Meds as prescribed  [] Modalities as prescribed   [] Call Physician     Frequency/Duration:  Plan  Times per week: 2  Plan weeks: 6  Current Treatment Recommendations: Strengthening,ROM,Stair training,Pain Management,Modalities,Gait Training,Manual Therapy - Joint Manipulation,Manual Therapy - Soft Tissue Mobilization,Home Exercise Program     Pt to continue current HEP. See objective section for any therapeutic exercise changes, additions or modifications this date.          PT Individual Minutes  Time In: 0484  Time Out: 0930  Minutes: 35  Timed Code Treatment Minutes: 20 Minutes  Procedure Minutes:15 minutes IES     Modality Time In Time Out Total Minutes Units    Ther ex ([id number]) [id number] 9998 14 1   Manual Therapy (74104)       Neuro re-ed (56645)       Massage (53596)       Estim unattended   (88471)         Signature:  Electronically signed by Pretty Jones PT on 3/23/22 at 10:07 AM EDT

## 2022-03-24 ENCOUNTER — OFFICE VISIT (OUTPATIENT)
Dept: ORTHOPEDIC SURGERY | Age: 47
End: 2022-03-24

## 2022-03-24 VITALS — TEMPERATURE: 97.6 F | WEIGHT: 315 LBS | HEIGHT: 72 IN | BODY MASS INDEX: 42.66 KG/M2

## 2022-03-24 DIAGNOSIS — M70.50 PES ANSERINE BURSITIS: Primary | ICD-10-CM

## 2022-03-24 DIAGNOSIS — Z98.890 POST-OPERATIVE STATE: ICD-10-CM

## 2022-03-24 PROCEDURE — 99024 POSTOP FOLLOW-UP VISIT: CPT | Performed by: ORTHOPAEDIC SURGERY

## 2022-03-24 RX ORDER — METHYLPREDNISOLONE 4 MG/1
TABLET ORAL
Qty: 21 TABLET | Refills: 0 | Status: SHIPPED | OUTPATIENT
Start: 2022-03-24 | End: 2022-04-28

## 2022-03-24 NOTE — LETTER
[address] [facility]  [address]  Phone: [phone]  Fax: [phone]    Alberto Klein MD        March 24, 2022     Patient: Natalie Simth   YOB: 1975   Date of Visit: 3/24/2022       To Whom It May Concern: It is my medical opinion that Monica Luis continue with physical therapy. He may return to work on 4/10/22. If you have any questions or concerns, please don't hesitate to call.     Sincerely,        Alberto Klein MD

## 2022-03-25 ENCOUNTER — HOSPITAL ENCOUNTER (OUTPATIENT)
Dept: PHYSICAL THERAPY | Age: 47
Setting detail: THERAPIES SERIES
End: 2022-03-25
Payer: COMMERCIAL

## 2022-03-25 NOTE — PROGRESS NOTES
Narrative Referring Provider:   Loco Najera  [id number] [facility] [address]    PCP:   MAHI Nolan CNP    ============================  IMPRESSION/PLAN:  ============================  Raynold Kil is s/p left Partial medial and partial lateral meniscectomy completed on 2022. IMPRESSION: At normal postoperative stage of recovery and Pes bursitis    PLAN:  No new treatment indicated. and Patient declined a steroid injection in the pes bursa  Routine follow-up. Ice compression and elevation  Patient Reassurance: Normal post-operative course discussed with patient. Patient reassured and supported. All questions answered. Follow up 2 months No X-Rays Needed    Rosalind Sierra presents today for a a routine 1st post-op visit    Status post op: Left partial medial and partial lateral meniscectomy    BMI: There is no height or weight on file to calculate BMI. Post-operative recovery was complicated by uneventful/none. Patient rates their condition as improving. Does the patient still experience pain? 2/10 pain, aching    Post Op discharge patient location: in home. Functional Assessment is as follows: Has started therapy. Functional difficulties: None. Pain Medication: Tylenol, rare oxycodone  Currently Ambulating with: No assistive devices     =================================  EXAM: POST OP KNEE  =================================   left Post-Operative Knee    Ambulates with a limp favoring the left. SKIN: Appropriate postop appearance, No evidence of erythema, warmth, discharge or drainage and Incision clean/dry/intact. Range of motion is 3 degrees in extension and   110 degrees of flexion. Extension La degrees    Pain with ROM: Yes with deeper flexion    There is Mild effusion.      Mal-alignment: No    Tender to the palpation of pes bursa    Neurovascular Status: Sensation Intact, Moves foot and ankle up & down, 2+ dorsalis pedis and negative homans sign    Stability:Varus/Valgus- Yes, stable    Quad strength: improving    Imaging:  none    Provider:   Jacinto Anderson MD

## 2022-03-28 ENCOUNTER — HOSPITAL ENCOUNTER (OUTPATIENT)
Dept: PHYSICAL THERAPY | Age: 47
Setting detail: THERAPIES SERIES
Discharge: HOME | End: 2022-03-28
Payer: COMMERCIAL

## 2022-03-28 PROCEDURE — 97110 THERAPEUTIC EXERCISES: CPT

## 2022-03-28 ASSESSMENT — PAIN DESCRIPTION - PROGRESSION: CLINICAL_PROGRESSION: RAPIDLY WORSENING

## 2022-03-28 NOTE — PROGRESS NOTES
[address] [facility]  Outpatient Physical Therapy    Treatment Note        Date: 3/28/2022  Patient: Joceline Valenzuela  : 1975  ACCT #: [de-identified]  Referring Practitioner: Carolyn Barton. Diagnosis: S/P left knee surgery:  medial and lateral menisectomy partial on the left  Treatment Diagnosis: Painful left knee with decreased strength and range of motion     Visit Information:  PT Visit Information  Onset Date: 22  PT Insurance Information: Juni [id number]  Total # of Visits Approved: 24  Total # of Visits to Date: 8  No Show: 0  Canceled Appointment: 0  Progress Note Counter:     Subjective: Pt reports feeling pretty good, the pain from twisting my knee is gone. No complaints from previous visit. HEP Compliance:  [x] Good [] Fair [] Poor [] Reports not doing due to:    Vital Signs  Patient Currently in Pain: Yes   Pain Screening  Patient Currently in Pain: Yes  Pain Assessment  Clinical Progression: Rapidly worsening    OBJECTIVE:   Exercises  Exercise 2: SLR  20 reps  Exercise 4: bike level 3 for 6 mnute  Exercise 5: TKE  #2 20 reps  Exercise 6: Hook lying ball squeeze  reps  Exercise 7: Sidelying abduction 2#  20 reps  Exercise 8: Bent knee fall out 20 reps with green band  Exercise 9: Standing ipsi/contralateral hip abduction and extension 20 reps with red  band  Exercise 12: 6 inch step up and down forward/backward and laterally 20 reps * ( attempt 8 inch next visit )  Exercise 13: Apollo leg extension and flexion 40# 20 reps       Strength: [x] NT  [] MMT completed:        ROM: [x] NT  [] ROM measurements:        Assessment: Body structures, Functions, Activity limitations: Decreased ROM,Decreased strength,Decreased endurance  Assessment: Pain levels have return to previous levels prior to twisting his knee. Progression of exercises today to include ankle 2#'s for improved strength and endurance.  Pt continues to work hard and report intermitten pain along medial joint line when in full WB on L LE only. Treatment Diagnosis: Painful left knee with decreased strength and range of motion  Prognosis: Excellent       Goals:  Short term goals  Time Frame for Short term goals: 3-6 treatments  Short term goal 1: Active flexion of the left knee to 116 degrees  Short term goal 2: Pain decreased to 3/10  (goal met 3/21/22)  Short term goal 3: Hamstring length:  SLR 0-60 left    Long term goals  Time Frame for Long term goals : 7-24  Long term goal 2: Strength of the left hip and knee 5/5  Long term goal 3: Pain 0/10 to 1/10 while walking  Long term goal 4: Patient will be able to tolerate regular work duty which includes stepping up on a stair 18 inches high  Long term goal 5: LEFS will be scored at discharge will be higher than 56/80  Progress toward goals:progression of exercises that includes ankle weights    POST-PAIN       Pain Rating (0-10 pain scale):   3/10   Location and pain description same as pre-treatment unless indicated. Action: [] NA   [x] Perform HEP  [] Meds as prescribed  [] Modalities as prescribed   [] Call Physician     Frequency/Duration:  Plan  Times per week: 2  Plan weeks: 6  Current Treatment Recommendations: Strengthening,ROM,Stair training,Pain Management,Modalities,Gait Training,Manual Therapy - Joint Manipulation,Manual Therapy - Soft Tissue Mobilization,Home Exercise Program     Pt to continue current HEP. See objective section for any therapeutic exercise changes, additions or modifications this date.          PT Individual Minutes  Time In: 3303  Time Out: 7849  Minutes: 42  Timed Code Treatment Minutes: 42 Minutes  Procedure Minutes:0   Modality Time In Time Out Total Minutes Units    Ther ex ([id number]) [id number] 3     Signature:  Electronically signed by Tiny Leon PTA on 3/28/22 at 12:19 PM EDT

## 2022-03-30 ENCOUNTER — HOSPITAL ENCOUNTER (OUTPATIENT)
Dept: PHYSICAL THERAPY | Age: 47
Setting detail: THERAPIES SERIES
Discharge: HOME | End: 2022-03-30
Payer: COMMERCIAL

## 2022-03-30 PROCEDURE — 97110 THERAPEUTIC EXERCISES: CPT

## 2022-03-30 ASSESSMENT — PAIN DESCRIPTION - PAIN TYPE: TYPE: SURGICAL PAIN

## 2022-03-30 ASSESSMENT — PAIN DESCRIPTION - PROGRESSION: CLINICAL_PROGRESSION: RAPIDLY WORSENING

## 2022-03-30 ASSESSMENT — PAIN DESCRIPTION - ORIENTATION: ORIENTATION: LEFT

## 2022-03-30 ASSESSMENT — PAIN SCALES - GENERAL: PAINLEVEL_OUTOF10: 4

## 2022-03-30 ASSESSMENT — PAIN DESCRIPTION - LOCATION: LOCATION: KNEE

## 2022-03-30 ASSESSMENT — PAIN DESCRIPTION - DESCRIPTORS: DESCRIPTORS: ACHING

## 2022-03-30 NOTE — PROGRESS NOTES
[address] [facility]  Outpatient Physical Therapy    Treatment Note        Date: 3/30/2022  Patient: Oley Dubin  : 1975  ACCT #: [de-identified]  Referring Practitioner: Abena Badillo. Diagnosis: S/P left knee surgery:  medial and lateral menisectomy partial on the left  Treatment Diagnosis: Painful left knee with decreased strength and range of motion     Visit Information:  PT Visit Information  Onset Date: 22  PT Insurance Information: Juni [id number]  Total # of Visits Approved: 24  Total # of Visits to Date: 9  No Show: 0  Canceled Appointment: 0  Progress Note Counter:     Subjective: The knee wasn't bad last time when we were done but by the time I got home it was really stiff again. HEP Compliance:  [x] Good [] Fair [] Poor [] Reports not doing due to:    Vital Signs  Patient Currently in Pain: Yes   Pain Screening  Patient Currently in Pain: Yes  Pain Assessment  Pain Assessment: 0-10  Pain Level: 4  Pain Type: Surgical pain  Pain Location: Knee  Pain Orientation: Left  Pain Descriptors: Aching  Clinical Progression: Rapidly worsening    OBJECTIVE:   Exercises  Exercise 2: SLR 2#  20 reps  Exercise 4: bike level 3 for 6 mnute  Exercise 5: TKE  #2 20 reps  Exercise 7: Sidelying abduction 2#  20 reps  Exercise 8: Bent knee fall out 20 reps with green band  Exercise 10: Hamstring string stretch on step 3 each leg 30 second hold  Exercise 12: 6 inch step up and down forward/backward and laterally 20 reps  Exercise 13: Apollo leg extension and flexion 40# 20 reps              Strength: [x] NT  [] MMT completed:     ROM: [] NT  [x] ROM measurements:           AROM LLE (degrees)  L SLR: 0-61  L Knee Flexion 0-145: 114  L Knee Extension 0: -7       Assessment:         Body structures, Functions, Activity limitations: Decreased ROM,Decreased strength,Decreased endurance  Assessment: Pt with reported pain along medial joint line with step ups today, held step height to 6 inches due to reports of pain. Pt continued to make progress with AROM of L LE with improved SLR, and knee flexion measurements today. Pain levels remain at 4-5/10 at post exercises. Treatment Diagnosis: Painful left knee with decreased strength and range of motion  Prognosis: Excellent       Goals:  Short term goals  Time Frame for Short term goals: 3-6 treatments  Short term goal 1: Active flexion of the left knee to 116 degrees  Short term goal 2: Pain decreased to 3/10  (goal met 3/21/22)  Short term goal 3: Hamstring length:  SLR 0-60 left    Long term goals  Time Frame for Long term goals : 7-24  Long term goal 2: Strength of the left hip and knee 5/5  Long term goal 3: Pain 0/10 to 1/10 while walking  Long term goal 4: Patient will be able to tolerate regular work duty which includes stepping up on a stair 18 inches high  Long term goal 5: LEFS will be scored at discharge will be higher than 56/80  Progress toward goals:Improved mobility of Left LE. POST-PAIN       Pain Rating (0-10 pain scale):   5/10   Location and pain description same as pre-treatment unless indicated. Action: [] NA   [x] Perform HEP  [] Meds as prescribed  [] Modalities as prescribed   [] Call Physician     Frequency/Duration:  Plan  Times per week: 2  Plan weeks: 6  Current Treatment Recommendations: Strengthening,ROM,Stair training,Pain Management,Modalities,Gait Training,Manual Therapy - Joint Manipulation,Manual Therapy - Soft Tissue Mobilization,Home Exercise Program     Pt to continue current HEP. See objective section for any therapeutic exercise changes, additions or modifications this date.          PT Individual Minutes  Time In: 7181  Time Out: 0940  Minutes: 49  Timed Code Treatment Minutes: 49 Minutes  Procedure Minutes:0     Modality Time In Time Out Total Minutes Units    Ther ex ([id number]) [id number] [phone]     Signature:  Electronically signed by Alonso Knox PTA on 3/30/22 at 9:46 AM EDT

## 2022-04-01 ENCOUNTER — APPOINTMENT (OUTPATIENT)
Dept: PHYSICAL THERAPY | Age: 47
End: 2022-04-01
Payer: COMMERCIAL

## 2022-04-04 ENCOUNTER — HOSPITAL ENCOUNTER (OUTPATIENT)
Dept: PHYSICAL THERAPY | Age: 47
Setting detail: THERAPIES SERIES
Discharge: HOME | End: 2022-04-04
Payer: COMMERCIAL

## 2022-04-04 PROCEDURE — 97110 THERAPEUTIC EXERCISES: CPT

## 2022-04-04 ASSESSMENT — PAIN DESCRIPTION - PROGRESSION: CLINICAL_PROGRESSION: RAPIDLY WORSENING

## 2022-04-04 NOTE — PROGRESS NOTES
strength,Decreased endurance  Assessment: Adjustments made to exercise program due to pain levels, held ankle weights at this time and WB activities in order to avoid further pain. Performed manual joint mobs of Left knee with distraction and AP mobs for pain relief, instructed pt to minimize walking and increase use of Ice today to help decrease pain. Will plan to return to previous level exercises at next visit if pain levels allow. Treatment Diagnosis: Painful left knee with decreased strength and range of motion  Prognosis: Excellent       Goals:  Short term goals  Time Frame for Short term goals: 3-6 treatments  Short term goal 1: Active flexion of the left knee to 116 degrees  Short term goal 2: Pain decreased to 3/10  (goal met 3/21/22)  Short term goal 3: Hamstring length:  SLR 0-60 left    Long term goals  Time Frame for Long term goals : 7-24  Long term goal 2: Strength of the left hip and knee 5/5  Long term goal 3: Pain 0/10 to 1/10 while walking  Long term goal 4: Patient will be able to tolerate regular work duty which includes stepping up on a stair 18 inches high  Long term goal 5: LEFS will be scored at discharge will be higher than 56/80  Progress toward goals:limited pain increased knee pain this visit, modifications made to exercise program for improved tolerance with current pain levels     POST-PAIN       Pain Rating (0-10 pain scale):   5/10   Location and pain description same as pre-treatment unless indicated. Action: [] NA   [x] Perform HEP  [] Meds as prescribed  [] Modalities as prescribed   [] Call Physician     Frequency/Duration:  Plan  Times per week: 2  Plan weeks: 6  Current Treatment Recommendations: Strengthening,ROM,Stair training,Pain Management,Modalities,Gait Training,Manual Therapy - Joint Manipulation,Manual Therapy - Soft Tissue Mobilization,Home Exercise Program     Pt to continue current HEP.   See objective section for any therapeutic exercise changes, additions or modifications this date.          PT Individual Minutes  Time In: 6527  Time Out: 2599  Minutes: 51  Timed Code Treatment Minutes: 41 Minutes  Procedure Minutes:CP x 10 min      Modality Time In Time Out Total Minutes Units    Ther ex ([id number]) [id number]   Manual Therapy ([id number]) [id number] 0   Cold Pack ([id number]) [id number] 0     Signature:  Electronically signed by Vic Cisneros PTA on 4/4/22 at 9:43 AM EDT  Addendum made to address progress towards goals section Electronically signed by Vic Cisneros PTA on 4/20/22 at 11:58 AM EDT

## 2022-04-06 ENCOUNTER — HOSPITAL ENCOUNTER (OUTPATIENT)
Dept: PHYSICAL THERAPY | Age: 47
Setting detail: THERAPIES SERIES
Discharge: HOME | End: 2022-04-06
Payer: COMMERCIAL

## 2022-04-06 PROCEDURE — 97110 THERAPEUTIC EXERCISES: CPT

## 2022-04-06 ASSESSMENT — PAIN DESCRIPTION - DESCRIPTORS: DESCRIPTORS: DULL;SHOOTING;STABBING

## 2022-04-06 ASSESSMENT — PAIN SCALES - GENERAL: PAINLEVEL_OUTOF10: 3

## 2022-04-06 ASSESSMENT — PAIN DESCRIPTION - FREQUENCY: FREQUENCY: INTERMITTENT

## 2022-04-06 ASSESSMENT — PAIN DESCRIPTION - LOCATION: LOCATION: KNEE

## 2022-04-06 ASSESSMENT — PAIN DESCRIPTION - PAIN TYPE: TYPE: SURGICAL PAIN

## 2022-04-06 ASSESSMENT — PAIN DESCRIPTION - PROGRESSION: CLINICAL_PROGRESSION: RAPIDLY WORSENING

## 2022-04-06 ASSESSMENT — PAIN DESCRIPTION - ORIENTATION: ORIENTATION: LEFT

## 2022-04-06 NOTE — PROGRESS NOTES
[address] [facility]  Outpatient Physical Therapy    Treatment Note        Date: 2022  Patient: Hugh Cason  : 1975  ACCT #: [de-identified]  Referring Practitioner: Agusto Ho. Diagnosis: S/P left knee surgery:  medial and lateral menisectomy partial on the left  Treatment Diagnosis: Painful left knee with decreased strength and range of motion     Visit Information:  PT Visit Information  Onset Date: 22  PT Insurance Information: Juni [id number]  Total # of Visits Approved: 24  Total # of Visits to Date: 11  No Show: 0  Canceled Appointment: 0  Progress Note Counter: 10/24    Subjective: My work has me going back to work on the , I don't feel that I am ready to return at this time but the Doctor said it was therapy's call. HEP Compliance:  [x] Good [] Fair [] Poor [] Reports not doing due to:    Vital Signs  Patient Currently in Pain: Yes   Pain Screening  Patient Currently in Pain: Yes  Pain Assessment  Pain Assessment: 0-10  Pain Level: 3  Pain Type: Surgical pain  Pain Location: Knee  Pain Orientation: Left  Pain Descriptors: Dull; Shooting; Stabbing  Pain Frequency: Intermittent  Clinical Progression: Rapidly worsening    OBJECTIVE:   Exercises  Exercise 2: SLR 2#  20 reps  Exercise 4: bike level 4 for 6 mnute  Exercise 5: TKE  #2 20 reps  Exercise 6: Hook lying ball squeeze  reps  Exercise 7: Sidelying abduction 2#  20 reps  Exercise 8: Bent knee fall out 20 reps with green band  Exercise 10: Hamstring string stretch on step 3 each leg 30 second hold  Exercise 12: 6 inch step up and down forward/backward and laterally 20 reps  Exercise 13: Apollo leg extension and flexion 40# 20 reps  Exercise 14: Bridges  3\" 2 x 10  Exercise 15: SLS 15\" x 3  Exercise 16: Apollo Leg Press 40# 2 x 10       Strength: [] NT  [x] MMT completed:     Strength LLE  L Hip Flexion: 5/5  L Hip Extension: 4/5  L Hip ABduction: 4+/5  L Hip ADduction: 4+/5  L Knee Flexion: 5/5  L Knee Extension: 5/5             ROM: [] NT  [x] ROM measurements:           AROM LLE (degrees)  L SLR: 0-62  L Hip Flexion 0-125: 0-87  L Hip Extension 0-10: 0-8  L Hip ABduction 0-45: 0-31  L Hip External Rotation 0-45: 0-33  L Hip Internal Rotation 0-45: 0-27  L Knee Flexion 0-145: 114  L Knee Extension 0: -10          Assessment: Body structures, Functions, Activity limitations: Decreased ROM,Decreased strength,Decreased endurance  Assessment: Increased pain noted with step ups both in forward and lateral motions at 6 inch, SLS pt requried finger tip touch in order to maintain balance and instability noted in David LE, unable to peform on AirEx foam. Pt's making improvements in both ROM and strength while still presenting with limitation in both area along with intermitten pain levels the prevent further progressions from this point. Treatment Diagnosis: Painful left knee with decreased strength and range of motion  Prognosis: Excellent       Goals:  Short term goals  Time Frame for Short term goals: 3-6 treatments  Short term goal 1: Active flexion of the left knee to 116 degrees  Short term goal 2: Pain decreased to 3/10  (goal met 3/21/22)  Short term goal 3: Hamstring length:  SLR 0-60 left    Long term goals  Time Frame for Long term goals : 7-24  Long term goal 2: Strength of the left hip and knee 5/5  Long term goal 3: Pain 0/10 to 1/10 while walking  Long term goal 4: Patient will be able to tolerate regular work duty which includes stepping up on a stair 18 inches high  Long term goal 5: LEFS will be scored at discharge will be higher than 56/80  Progress toward goals: Improved mobility and strength     POST-PAIN       Pain Rating (0-10 pain scale):  0/10   Location and pain description same as pre-treatment unless indicated.    Action: [] NA   [x] Perform HEP  [] Meds as prescribed  [] Modalities as prescribed   [] Call Physician     Frequency/Duration:  Plan  Times per week: 2  Plan weeks: 6  Current Treatment Recommendations: Strengthening,ROM,Stair training,Pain Management,Modalities,Gait Training,Manual Therapy - Joint Manipulation,Manual Therapy - Soft Tissue Mobilization,Home Exercise Program     Pt to continue current HEP. See objective section for any therapeutic exercise changes, additions or modifications this date.          PT Individual Minutes  Time In: 8824  Time Out: 1857  Minutes: 54  Timed Code Treatment Minutes: 54 Minutes  Procedure Minutes:0     Modality Time In Time Out Total Minutes Units    Ther ex ([id number]) [phone]     Signature:  Electronically signed by Adelaida Kaiser PTA on 4/6/22 at 9:47 AM EDT

## 2022-04-07 ENCOUNTER — TELEPHONE (OUTPATIENT)
Dept: ORTHOPEDIC SURGERY | Age: 47
End: 2022-04-07

## 2022-04-07 NOTE — TELEPHONE ENCOUNTER
Miya Del Toro from [address] [place] [address] has contacted orthopedic office stating that he did not believe patient was ready to go back to work on 4/10/2022. Miya Del Toro stated that he believes it is in the patients best interest to have more physical therapy visits and aim to RTW on 5/1/2022. Patient is a Workers comp case. The [employer] has been updated and sent into [facility] reflecting these changes. Dr. Mcarthur Lefort notified as well.

## 2022-04-08 ENCOUNTER — HOSPITAL ENCOUNTER (OUTPATIENT)
Dept: PHYSICAL THERAPY | Age: 47
Setting detail: THERAPIES SERIES
Discharge: HOME | End: 2022-04-08
Payer: COMMERCIAL

## 2022-04-08 PROCEDURE — 97110 THERAPEUTIC EXERCISES: CPT

## 2022-04-08 ASSESSMENT — PAIN DESCRIPTION - PROGRESSION: CLINICAL_PROGRESSION: RAPIDLY WORSENING

## 2022-04-08 NOTE — PROGRESS NOTES
[address] [facility]  Outpatient Physical Therapy    Treatment Note        Date: 2022  Patient: Prema Duckworth  : 1975  ACCT #: [de-identified]  Referring Practitioner: Isela Amaya. Diagnosis: S/P left knee surgery:  medial and lateral menisectomy partial on the left  Treatment Diagnosis: Painful left knee with decreased strength and range of motion     Visit Information:  PT Visit Information  Onset Date: 22  PT Insurance Information: Juni [id number]  Total # of Visits Approved: 24  Total # of Visits to Date: 12  No Show: 0  Canceled Appointment: 0  Progress Note Counter:     Subjective: Pt arrived to work reporting mild pain in left knee of 2-3/10, sore following last tx session lasting a few hours. Working with  and  with return to work orders. HEP Compliance:  [x] Good [] Fair [] Poor [] Reports not doing due to:    Vital Signs  Patient Currently in Pain: Yes   Pain Screening  Patient Currently in Pain: Yes  Pain Assessment  Clinical Progression: Rapidly worsening    OBJECTIVE:   Exercises  Exercise 2: SLR 2#  20 reps  Exercise 4: bike level 5 for 6 mnute  Exercise 5: TKE  #2 20 reps - DC to Apollo Knee Ext  Exercise 6: Hook lying ball squeeze  reps  Exercise 7: Sidelying abduction 2#  20 reps  Exercise 8: Bent knee fall out 20 reps with green band  Exercise 10: Hamstring string stretch on step 3 each leg 30 second hold  Exercise 11: Sitting knee flexion with green band 20 reps - DC to Apollo Knee flexion  Exercise 12: 8 inch step up and down forward and laterally , 6 inch downward 10-15 reps  Exercise 13: Apollo leg extension and flexion 40# 20 reps  Exercise 15: SLS 20\" x 3  Exercise 16: Apollo Leg Press 50# 2 x 10  Exercise 17: Sit-stand x 10  Exercise 18: Mini Squats*      Strength: [x] NT  [] MMT completed:     ROM: [x] NT  [] ROM measurements:        Modalities:  Modalities  Cryotherapy (Minutes\Location):  Ice to left knee after exercise for pain control     *Indicates exercise, modality, or manual techniques to be initiated when appropriate    Assessment: Body structures, Functions, Activity limitations: Decreased ROM,Decreased strength,Decreased endurance  Assessment: Increased step ups to 8 inch box level today with forward and lateral motions with good tolerance while pt is unable to perform with step down due to increaased knee pain, continue to perform step downs at 6 inch heigh with good tolerance. Treatment Diagnosis: Painful left knee with decreased strength and range of motion  Prognosis: Excellent       Goals:  Short term goals  Time Frame for Short term goals: 3-6 treatments  Short term goal 1: Active flexion of the left knee to 116 degrees  Short term goal 2: Pain decreased to 3/10  (goal met 3/21/22)  Short term goal 3: Hamstring length:  SLR 0-60 left    Long term goals  Time Frame for Long term goals : 7-24  Long term goal 2: Strength of the left hip and knee 5/5  Long term goal 3: Pain 0/10 to 1/10 while walking  Long term goal 4: Patient will be able to tolerate regular work duty which includes stepping up on a stair 18 inches high  Long term goal 5: LEFS will be scored at discharge will be higher than 56/80  Progress toward goals:progression of exercises. POST-PAIN       Pain Rating (0-10 pain scale):  3 /10   Location and pain description same as pre-treatment unless indicated. Action: [] NA   [x] Perform HEP  [] Meds as prescribed  [] Modalities as prescribed   [] Call Physician     Frequency/Duration:  Plan  Times per week: 2  Plan weeks: 6  Current Treatment Recommendations: Strengthening,ROM,Stair training,Pain Management,Modalities,Gait Training,Manual Therapy - Joint Manipulation,Manual Therapy - Soft Tissue Mobilization,Home Exercise Program     Pt to continue current HEP. See objective section for any therapeutic exercise changes, additions or modifications this date.          PT Individual Minutes  Time In: 0249  Time Out: 0900  Minutes: 63  Timed Code Treatment Minutes: 53 Minutes  Procedure Minutes: 10 min CP      Modality Time In Time Out Total Minutes Units    Ther ex ([id number]) [phone]   Cold CZTQ([id number]) [phone] 0     Signature:  Electronically signed by Susana Alston PTA on 4/8/22 at 8:57 AM EDT  Addendum made to correct post tx pain levels Electronically signed by Susana Alston PTA on 4/20/22 at 11:56 AM EDT

## 2022-04-11 ENCOUNTER — HOSPITAL ENCOUNTER (OUTPATIENT)
Dept: PHYSICAL THERAPY | Age: 47
Setting detail: THERAPIES SERIES
Discharge: HOME | End: 2022-04-11
Payer: COMMERCIAL

## 2022-04-11 PROCEDURE — 97110 THERAPEUTIC EXERCISES: CPT | Performed by: PHYSICAL THERAPIST

## 2022-04-11 ASSESSMENT — PAIN DESCRIPTION - ORIENTATION: ORIENTATION: LEFT

## 2022-04-11 ASSESSMENT — PAIN - FUNCTIONAL ASSESSMENT: PAIN_FUNCTIONAL_ASSESSMENT: PREVENTS OR INTERFERES SOME ACTIVE ACTIVITIES AND ADLS

## 2022-04-11 ASSESSMENT — PAIN DESCRIPTION - LOCATION: LOCATION: KNEE

## 2022-04-11 ASSESSMENT — PAIN DESCRIPTION - PROGRESSION: CLINICAL_PROGRESSION: GRADUALLY IMPROVING

## 2022-04-11 ASSESSMENT — PAIN DESCRIPTION - DESCRIPTORS: DESCRIPTORS: ACHING

## 2022-04-11 ASSESSMENT — PAIN DESCRIPTION - PAIN TYPE: TYPE: SURGICAL PAIN

## 2022-04-11 ASSESSMENT — PAIN SCALES - GENERAL: PAINLEVEL_OUTOF10: 1

## 2022-04-11 ASSESSMENT — PAIN DESCRIPTION - FREQUENCY: FREQUENCY: INTERMITTENT

## 2022-04-11 NOTE — PROGRESS NOTES
[address] [facility]  Outpatient Physical Therapy    Treatment Note        Date: 2022  Patient: Wilda Mcdonnell  : 1975  ACCT #: [de-identified]  Referring Practitioner: Willie Garcia. Diagnosis: S/P left knee surgery:  medial and lateral menisectomy partial on the left  Treatment Diagnosis: Painful left knee with decreased strength and range of motion     Visit Information:  PT Visit Information  Onset Date: 22  PT Insurance Information: Juni [id number]  Total # of Visits Approved: 24  Total # of Visits to Date:   No Show: 0  Canceled Appointment: 0  Progress Note Counter:     Subjective: Patient states that he was able to drive with less pain over the weekend. He is being more concious of the postition of his leg while driving.   Comments: O- [address] Working states he has to finish his first 24 treatments before it will be considered for more treatments  HEP Compliance:  [x] Good [] Fair [] Poor [] Reports not doing due to:    Vital Signs  Patient Currently in Pain: Yes   Pain Screening  Patient Currently in Pain: Yes  Pain Assessment  Pain Assessment: 0-10  Pain Level: 1  Patient's Stated Pain Goal: No pain  Pain Type: Surgical pain  Pain Location: Knee  Pain Orientation: Left  Pain Descriptors: Aching  Pain Frequency: Intermittent  Clinical Progression: Gradually improving  Functional Pain Assessment: Prevents or interferes some active activities and ADLs    OBJECTIVE:   Exercises  Exercise 2: SLR 2#  20 reps  Exercise 3: heel slides in supine 20 reps  2#  Exercise 5: TKE  #2 20 reps -  Exercise 6: Hook lying ball squeeze  reps  Exercise 7: Sidelying abduction 2#  20 reps  Exercise 8: Bent knee fall out 20 reps with green band  Exercise 9: Standing ipsi/contralateral hip abduction and extension 20 reps with red  band  Exercise 10: Hamstring string stretch on step 3 each leg 30 second hold  Exercise 12: 8 inch step up and down forward and laterally , 6 inch downward 10-15 reps  Exercise 13: Apollo leg extension and flexion 40# 20 reps  Exercise 15: SLS 20\" x 3  Exercise 16: Apollo Leg Press 50# 2 x 10  Exercise 17: Sit-stand x 10  Exercise 18: Mini Squats*          Strength: [x] NT  [] MMT completed:        ROM: [x] NT  [] ROM measurements:         *Indicates exercise, modality, or manual techniques to be initiated when appropriate    Assessment:   Activity Tolerance  Activity Tolerance: Patient Tolerated treatment well       Assessment: Patient continues to slowly improve his strength and walking ability. He continues to need verbal cues to postion his knee knee correctly during tasks  Treatment Diagnosis: Painful left knee with decreased strength and range of motion     PT Education: Home Exercise Program  Patient Education: Given written instructions and instructed in quad sets, active range of motion, and hamstring stretching, walking    Goals:  Short term goals  Time Frame for Short term goals: 3-6 treatments  Short term goal 1: Active flexion of the left knee to 116 degrees  Short term goal 2: Pain decreased to 3/10  (goal met 3/21/22)  Short term goal 3: Hamstring length:  SLR 0-60 left    Long term goals  Time Frame for Long term goals : 7-24  Long term goal 1: Active range of motion of the left knee within normal limits  Long term goal 2: Strength of the left hip and knee 5/5  Long term goal 3: Pain 0/10 to 1/10 while walking  Long term goal 4: Patient will be able to tolerate regular work duty which includes stepping up on a stair 18 inches high  Long term goal 5: LEFS will be scored at discharge will be higher than 56/80  Progress toward goals:Good and on-going  POST-PAIN       Pain Rating (0-10 pain scale):  1 /10   Location and pain description same as pre-treatment unless indicated.    Action: [] NA   [x] Perform HEP  [] Meds as prescribed  [] Modalities as prescribed   [] Call Physician     Frequency/Duration:  Plan  Times per week: 2  Plan weeks: 6  Current

## 2022-04-13 ENCOUNTER — HOSPITAL ENCOUNTER (OUTPATIENT)
Dept: PHYSICAL THERAPY | Age: 47
Setting detail: THERAPIES SERIES
Discharge: HOME | End: 2022-04-13
Payer: COMMERCIAL

## 2022-04-13 PROCEDURE — 97110 THERAPEUTIC EXERCISES: CPT | Performed by: PHYSICAL THERAPIST

## 2022-04-13 ASSESSMENT — PAIN DESCRIPTION - FREQUENCY: FREQUENCY: CONTINUOUS

## 2022-04-13 ASSESSMENT — PAIN DESCRIPTION - PROGRESSION: CLINICAL_PROGRESSION: NOT CHANGED

## 2022-04-13 ASSESSMENT — PAIN SCALES - GENERAL: PAINLEVEL_OUTOF10: 3

## 2022-04-13 ASSESSMENT — PAIN DESCRIPTION - PAIN TYPE: TYPE: SURGICAL PAIN

## 2022-04-13 ASSESSMENT — PAIN DESCRIPTION - LOCATION: LOCATION: KNEE

## 2022-04-13 ASSESSMENT — PAIN DESCRIPTION - ORIENTATION: ORIENTATION: LEFT

## 2022-04-13 ASSESSMENT — PAIN DESCRIPTION - DESCRIPTORS: DESCRIPTORS: ACHING

## 2022-04-13 ASSESSMENT — PAIN - FUNCTIONAL ASSESSMENT: PAIN_FUNCTIONAL_ASSESSMENT: PREVENTS OR INTERFERES SOME ACTIVE ACTIVITIES AND ADLS

## 2022-04-13 NOTE — PROGRESS NOTES
[address] [facility]  Outpatient Physical Therapy    Treatment Note        Date: 2022  Patient: Yadi Romero  : 1975  ACCT #: [de-identified]  Referring Practitioner: Duyen Mejia   Diagnosis: S/P left knee surgery:  medial and lateral menisectomy partial on the left  Treatment Diagnosis: Painful left knee with decreased strength and range of motion     Visit Information:  PT Visit Information  Onset Date: 22  PT Insurance Information: Juni [id number]  Total # of Visits Approved: 24  Total # of Visits to Date:   No Show: 0  Canceled Appointment: 0  Progress Note Counter:     Subjective: Patient reports stiffness and mild soreness of left knee and left ankle  Comments: [facility] [address] Working states he has to finish his first 24 treatments before it will be considered for more treatments  HEP Compliance:  [x] Good [] Fair [] Poor [] Reports not doing due to:    Vital Signs  Patient Currently in Pain: Yes   Pain Screening  Patient Currently in Pain: Yes  Pain Assessment  Pain Assessment: 0-10  Pain Level: 3  Patient's Stated Pain Goal: No pain  Pain Type: Surgical pain  Pain Location: Knee  Pain Orientation: Left  Pain Descriptors: Aching  Pain Frequency: Continuous  Clinical Progression: Not changed  Functional Pain Assessment: Prevents or interferes some active activities and ADLs    OBJECTIVE:   Exercises  Exercise 2: SLR 3#  20 reps  Exercise 3: heel slides in supine 20 reps  3#  Exercise 4: bike level 5 for 6 mnute  Exercise 5: TKE  #3 20 reps -  Exercise 6: Hook lying ball squeeze 20  reps  Exercise 7: Sidelying abduction 3#  20 reps  Exercise 8: Bent knee fall out 20 reps with green band  Exercise 9: Standing ipsi/contralateral hip abduction and extension 20 reps with red  band  Exercise 12: 8 inch step up and down forward and laterally , 6 inch downward 10-15 reps  Exercise 13: Apollo leg extension and flexion 40# 20 reps  Exercise 15: SLS  3# 20 reps  Exercise 16: Apollo Leg Press 50# 2 x 10  Exercise 17: Sit-stand x 10 with 6# ball  Exercise 18: Mini Squats*       Strength: [x] NT  [] MMT completed:     ROM: [x] NT  [] ROM measurements:        Modalities:  Modalities  Cryotherapy (Minutes\Location): Ice to left knee after exercise for pain control     *Indicates exercise, modality, or manual techniques to be initiated when appropriate    Assessment:   Activity Tolerance  Activity Tolerance: Patient Tolerated treatment well  Activity Tolerance: Patient was able to increase cuff weights today for strengthening    Body structures, Functions, Activity limitations: Decreased ROM,Decreased strength,Decreased endurance  Assessment: Patient is slowly progressing in his strength. He needs to increase his walking endurance and he has been encouraged to walk more at home. Will have him start walking on uneven ground in therapy to prepare him for return to work.   Treatment Diagnosis: Painful left knee with decreased strength and range of motion  Prognosis: Excellent  PT Education: Home Exercise Program  Patient Education: Given written instructions and instructed in quad sets, active range of motion, and hamstring stretching, walking    Goals:  Short term goals  Time Frame for Short term goals: 3-6 treatments  Short term goal 1: Active flexion of the left knee to 116 degrees  Short term goal 2: Pain decreased to 3/10  (goal met 3/21/22)  Short term goal 3: Hamstring length:  SLR 0-60 left    Long term goals  Time Frame for Long term goals : 7-24  Long term goal 1: Active range of motion of the left knee within normal limits  Long term goal 2: Strength of the left hip and knee 5/5  Long term goal 3: Pain 0/10 to 1/10 while walking  Long term goal 4: Patient will be able to tolerate regular work duty which includes stepping up on a stair 18 inches high  Long term goal 5: LEFS will be scored at discharge will be higher than 56/80  Progress toward goals: Good and on-going    POST-PAIN Pain Rating (0-10 pain scale):  1 /10   Location and pain description same as pre-treatment unless indicated. Action: [] NA   [x] Perform HEP  [] Meds as prescribed  [] Modalities as prescribed   [] Call Physician     Frequency/Duration:  Plan  Times per week: 2  Plan weeks: 6  Current Treatment Recommendations: Strengthening,ROM,Stair training,Pain Management,Modalities,Gait Training,Manual Therapy - Joint Manipulation,Manual Therapy - Soft Tissue Mobilization,Home Exercise Program  Plan Comment: Add uneven ground walking the next visit     Pt to continue current HEP. See objective section for any therapeutic exercise changes, additions or modifications this date.          PT Individual Minutes  Time In: 0900  Time Out: 1000  Minutes: 60  Timed Code Treatment Minutes: 50 Minutes  Procedure Minutes:10 minutes cold pack     Modality Time In Time Out Total Minutes Units    Ther ex (00500) 0900 0950 50 3   Manual Therapy ([id number])       Neuro re-ed ([id number])       Massage ([id number])       Estim unattended   (21699)         Signature:  Electronically signed by Destiny Sandra PT on 4/13/22 at 11:34 AM EDT

## 2022-04-15 ENCOUNTER — HOSPITAL ENCOUNTER (OUTPATIENT)
Dept: PHYSICAL THERAPY | Age: 47
Setting detail: THERAPIES SERIES
Discharge: HOME | End: 2022-04-15
Payer: COMMERCIAL

## 2022-04-15 PROCEDURE — 97110 THERAPEUTIC EXERCISES: CPT | Performed by: PHYSICAL THERAPIST

## 2022-04-15 ASSESSMENT — PAIN DESCRIPTION - PROGRESSION: CLINICAL_PROGRESSION: NOT CHANGED

## 2022-04-15 ASSESSMENT — PAIN SCALES - GENERAL: PAINLEVEL_OUTOF10: 3

## 2022-04-15 ASSESSMENT — PAIN - FUNCTIONAL ASSESSMENT: PAIN_FUNCTIONAL_ASSESSMENT: PREVENTS OR INTERFERES SOME ACTIVE ACTIVITIES AND ADLS

## 2022-04-15 ASSESSMENT — PAIN DESCRIPTION - PAIN TYPE: TYPE: SURGICAL PAIN

## 2022-04-15 ASSESSMENT — PAIN DESCRIPTION - FREQUENCY: FREQUENCY: INTERMITTENT

## 2022-04-15 ASSESSMENT — PAIN DESCRIPTION - DESCRIPTORS: DESCRIPTORS: ACHING

## 2022-04-15 ASSESSMENT — PAIN DESCRIPTION - LOCATION: LOCATION: KNEE

## 2022-04-15 ASSESSMENT — PAIN DESCRIPTION - ORIENTATION: ORIENTATION: LEFT

## 2022-04-15 NOTE — PROGRESS NOTES
[address] [facility]  Outpatient Physical Therapy    Treatment Note        Date: 4/15/2022  Patient: Berry Barrientos  : 1975  ACCT #: [de-identified]  Referring Practitioner: Lorrie Gonzalez.   Diagnosis: S/P left knee surgery:  medial and lateral menisectomy partial on the left  Treatment Diagnosis: Painful left knee with decreased strength and range of motion     Visit Information:  PT Visit Information  Onset Date: 22  PT Insurance Information: Juni [id number]  Total # of Visits Approved: 24  Total # of Visits to Date: 15  Progress Note Counter: 15/24    Subjective: Patient states that his knee is painful with walking, decreased pain with sitting  Comments: O- [address] Working states he has to finish his first 24 treatments before it will be considered for more treatments  HEP Compliance:  [x] Good [] Fair [] Poor [] Reports not doing due to:    Vital Signs  Patient Currently in Pain: Yes   Pain Screening  Patient Currently in Pain: Yes  Pain Assessment  Pain Assessment: 0-10  Pain Level: 3  Patient's Stated Pain Goal: No pain  Pain Type: Surgical pain  Pain Location: Knee  Pain Orientation: Left  Pain Descriptors: Aching  Pain Frequency: Intermittent  Clinical Progression: Not changed  Functional Pain Assessment: Prevents or interferes some active activities and ADLs    OBJECTIVE:   Exercises  Exercise 2: SLR 3#  20 reps  Exercise 3: heel slides in supine 20 reps  3#  Exercise 4: bike level 5 for 6 mnute  Exercise 5: TKE  #3 20 reps -  Exercise 6: Hook lying ball squeeze 20  reps  Exercise 7: Sidelying abduction 3#  20 reps  Exercise 8: Bent knee fall out 20 reps with green band  Exercise 10: Hamstring string stretch on step 3 each leg 30 second hold  Exercise 11: Sitting knee flexion with green band 20 reps - DC to Apollo Knee flexion  Exercise 12: 8 inch step up and down forward and laterally , 6 inch downward 10-15 reps  Exercise 13: Apollo leg extension and flexion 40# 20 reps  Exercise 15: SLS  3# 20 reps  Exercise 16: Apollo Leg Press 50# 2 x 10  Exercise 17: Sit-stand x 10 with 6# ball  Exercise 19: Black theraband for plantar flexion (hold 30 seconds or greater to simulate pushing on pedal in bulldozer)       Strength: [x] NT  [] MMT completed:     ROM: [] NT  [x] ROM measurements:   AROM LLE (degrees)  L SLR: 0-62  L Hip Flexion 0-125: 0-90  L Hip Extension 0-10: 0-10  L Hip ABduction 0-45: 0-40  L Hip ADduction 0-10: 0-10  L Hip External Rotation 0-45: 0-33  L Hip Internal Rotation 0-45: 0-30  L Knee Flexion 0-145: 114  L Knee Extension 0: -10     Modalities:  Modalities  Cryotherapy (Minutes\Location):  Ice to left knee after exercise for pain control     *Indicates exercise, modality, or manual techniques to be initiated when appropriate    Assessment:   Activity Tolerance  Activity Tolerance: Patient Tolerated treatment well    Body structures, Functions, Activity limitations: Decreased ROM,Decreased strength,Decreased endurance  Assessment: He is progressing well, but needs continued strengthening and endurance so he can tolerate regular work duty tasks  Treatment Diagnosis: Painful left knee with decreased strength and range of motion  Prognosis: Excellent  Patient Education: Given written instructions and instructed in quad sets, active range of motion, and hamstring stretching, walking    Goals:  Short term goals  Time Frame for Short term goals: 3-6 treatments  Short term goal 1: Active flexion of the left knee to 116 degrees  Short term goal 2: Pain decreased to 3/10  (goal met 3/21/22)  Short term goal 3: Hamstring length:  SLR 0-60 left  (goal met 4/15/22)    Long term goals  Time Frame for Long term goals : 7-24  Long term goal 1: Active range of motion of the left knee within normal limits  Long term goal 2: Strength of the left hip and knee 5/5  Long term goal 3: Pain 0/10 to 1/10 while walking  Long term goal 4: Patient will be able to tolerate regular work duty which includes stepping up on a stair 18 inches high  Long term goal 5: LEFS will be scored at discharge will be higher than 56/80  Progress toward goals: good and on-going    POST-PAIN       Pain Rating (0-10 pain scale):  3 /10   Location and pain description same as pre-treatment unless indicated. Action: [] NA   [x] Perform HEP  [] Meds as prescribed  [] Modalities as prescribed   [] Call Physician     Frequency/Duration:  Plan  Times per week: 2  Plan weeks: 6  Current Treatment Recommendations: Strengthening,ROM,Stair training,Pain Management,Modalities,Gait Training,Manual Therapy - Joint Manipulation,Manual Therapy - Soft Tissue Mobilization,Home Exercise Program     Pt to continue current HEP. See objective section for any therapeutic exercise changes, additions or modifications this date.          PT Individual Minutes  Time In: 0900  Time Out: 1000  Minutes: 60  Timed Code Treatment Minutes: 50 Minutes  Procedure Minutes:10 minutes ice     Modality Time In Time Out Total Minutes Units    Ther ex (65040) 0900 0950 50 3   Manual Therapy ([id number])       Neuro re-ed (34154)       Massage ([id number])       Estim unattended   ([id number])         Signature:  Electronically signed by Destiny Sandra, PT on 4/15/22 at 2:07 PM EDT

## 2022-04-18 ENCOUNTER — HOSPITAL ENCOUNTER (OUTPATIENT)
Dept: PHYSICAL THERAPY | Age: 47
Setting detail: THERAPIES SERIES
Discharge: HOME | End: 2022-04-18
Payer: COMMERCIAL

## 2022-04-18 PROCEDURE — 97110 THERAPEUTIC EXERCISES: CPT

## 2022-04-18 ASSESSMENT — PAIN DESCRIPTION - PROGRESSION: CLINICAL_PROGRESSION: NOT CHANGED

## 2022-04-18 NOTE — PROGRESS NOTES
[address] [facility]  Outpatient Physical Therapy    Treatment Note        Date: 2022  Patient: Emmet Schilder  : 1975  ACCT #: [de-identified]  Referring Practitioner: Alden Lopez. Diagnosis: S/P left knee surgery:  medial and lateral menisectomy partial on the left  Treatment Diagnosis: Painful left knee with decreased strength and range of motion     Visit Information:  PT Visit Information  Onset Date: 22  PT Insurance Information: Juni [id number]  Total # of Visits Approved: 24  Total # of Visits to Date:   No Show: 0  Canceled Appointment: 0  Progress Note Counter:     Subjective: Pt arrived to therapy today reporting general knee pain of 2-3/10. Comments: [facility] Working states he has to finish his first 24 treatments before it will be considered for more treatments  HEP Compliance:  [x] Good [] Fair [] Poor [] Reports not doing due to:    Vital Signs  Patient Currently in Pain: Yes   Pain Screening  Patient Currently in Pain: Yes  Pain Assessment  Clinical Progression: Not changed    OBJECTIVE:   Exercises  Exercise 1: BOSU Lunges Fwd with limited ROM, x1 UE support on Rail, VCs to control depth and lateral movement of Left Knee. Exercise 4: bike level 6 for 6 mnute  Exercise 9: Standing ipsi/contralateral hip abduction and extension 20 reps with red  band  Exercise 11: Monster Walks 20\" x 6  (3-laps)  Exercise 12: 8 inch step up and down forward and laterally , 6 inch downward 10-15 reps  Exercise 13: Apollo leg extension and flexion 40# 20 reps  Exercise 15: SLS  20 sec. x 3  Exercise 16: Apollo Leg Press 50# 2 x 10  Exercise 17: Sit-stand x 10 with 6# ball  Exercise 18:  Mini Squats x 10  Exercise 19: Silver  theraband for plantar flexion (hold 30 seconds or greater to simulate pushing on pedal in bulldozer) x 5       Strength: [x] NT  [] MMT completed:     ROM: [x] NT  [] ROM measurements:        Modalities:  Modalities  Other: Pt declined CP today Left message for patient to please call back to reschedule appointment for INR.     [address] [facility]  Antico[facility] Service  [phone] *Indicates exercise, modality, or manual techniques to be initiated when appropriate    Assessment: Body structures, Functions, Activity limitations: Decreased ROM,Decreased strength,Decreased endurance  Assessment: Progression of exercises today to include squats, lunges and TKE's with Green T-band to improved knee strength and stabiltiy. Limited Lunges due to increased pain levels reported with deeper motions. VCs to manage depth and lateral sway in order to focus on smooth controled motions. Increased time spent in [place] activities to further improve tolerance . Treatment Diagnosis: Painful left knee with decreased strength and range of motion  Prognosis: Excellent       Goals:  Short term goals  Time Frame for Short term goals: 3-6 treatments  Short term goal 1: Active flexion of the left knee to 116 degrees  Short term goal 2: Pain decreased to 3/10  (goal met 3/21/22)  Short term goal 3: Hamstring length:  SLR 0-60 left  (goal met 4/15/22)    Long term goals  Time Frame for Long term goals : 7-24  Long term goal 1: Active range of motion of the left knee within normal limits  Long term goal 2: Strength of the left hip and knee 5/5  Long term goal 3: Pain 0/10 to 1/10 while walking  Long term goal 4: Patient will be able to tolerate regular work duty which includes stepping up on a stair 18 inches high  Long term goal 5: LEFS will be scored at discharge will be higher than 56/80  Progress toward goals:Progression of exercises. POST-PAIN       Pain Rating (0-10 pain scale):  2 /10   Location and pain description same as pre-treatment unless indicated.    Action: [] NA   [x] Perform HEP  [] Meds as prescribed  [] Modalities as prescribed   [] Call Physician     Frequency/Duration:  Plan  Times per week: 2  Plan weeks: 6  Current Treatment Recommendations: Strengthening,ROM,Stair training,Pain Management,Modalities,Gait Training,Manual Therapy - Joint Manipulation,Manual Therapy - Soft Tissue Mobilization,Home Exercise Program     Pt to continue current HEP. See objective section for any therapeutic exercise changes, additions or modifications this date.          PT Individual Minutes  Time In: 1450  Time Out: 9259  Minutes: 44  Timed Code Treatment Minutes: 44 Minutes  Procedure Minutes:0     Modality Time In Time Out Total Minutes Units    Ther ex ([id number]) 3712 0042 37 3     Signature:  Electronically signed by Angela Dinh PTA on 4/18/22 at 3:38 PM EDT

## 2022-04-20 ENCOUNTER — HOSPITAL ENCOUNTER (OUTPATIENT)
Dept: PHYSICAL THERAPY | Age: 47
Setting detail: THERAPIES SERIES
Discharge: HOME | End: 2022-04-20
Payer: COMMERCIAL

## 2022-04-20 PROCEDURE — 97110 THERAPEUTIC EXERCISES: CPT | Performed by: PHYSICAL THERAPIST

## 2022-04-20 ASSESSMENT — PAIN SCALES - GENERAL: PAINLEVEL_OUTOF10: 3

## 2022-04-20 ASSESSMENT — PAIN DESCRIPTION - DESCRIPTORS: DESCRIPTORS: ACHING

## 2022-04-20 ASSESSMENT — PAIN DESCRIPTION - PROGRESSION: CLINICAL_PROGRESSION: NOT CHANGED

## 2022-04-20 ASSESSMENT — PAIN DESCRIPTION - ONSET: ONSET: ON-GOING

## 2022-04-20 ASSESSMENT — PAIN DESCRIPTION - PAIN TYPE: TYPE: SURGICAL PAIN

## 2022-04-20 ASSESSMENT — PAIN DESCRIPTION - FREQUENCY: FREQUENCY: INTERMITTENT

## 2022-04-20 ASSESSMENT — PAIN - FUNCTIONAL ASSESSMENT: PAIN_FUNCTIONAL_ASSESSMENT: PREVENTS OR INTERFERES SOME ACTIVE ACTIVITIES AND ADLS

## 2022-04-20 ASSESSMENT — PAIN DESCRIPTION - LOCATION: LOCATION: KNEE

## 2022-04-20 ASSESSMENT — PAIN DESCRIPTION - ORIENTATION: ORIENTATION: LEFT

## 2022-04-20 NOTE — PROGRESS NOTES
[address] [facility]  Outpatient Physical Therapy    Treatment Note        Date: 2022  Patient: Deanna Lemons  : 1975  ACCT #: [de-identified]        Treatment Diagnosis: Painful left knee with decreased strength and range of motion     Visit Information:  PT Visit Information  Onset Date: 22  PT Insurance Information: Juni [id number]  Total # of Visits Approved: 24  Total # of Visits to Date: 17  No Show: 0  Canceled Appointment: 0  Progress Note Counter:     Subjective: Reports doing okay, knee pain 2-3/10     HEP Compliance:  [x] Good [] Fair [] Poor [] Reports not doing due to:        Pain Assessment  Pain Assessment: 0-10  Pain Level: 3  Patient's Stated Pain Goal: 0 - No pain  Pain Location: Knee  Pain Orientation: Left  Pain Descriptors: Aching  Functional Pain Assessment: Prevents or interferes some active activities and ADLs  Pain Type: Surgical pain  Pain Frequency: Intermittent  Pain Onset: On-going    OBJECTIVE:   Exercises  Exercise 1: BOSU Lunges Fwd with limited ROM, x1 UE support on Rail, VCs to control depth and lateral movement of Left Knee. Exercise 2: SLR 4#  20 reps  Exercise 3: heel slides in supine 20 reps  4#  Exercise 4: bike level 6 for 6 mnute  Exercise 6: Hook lying ball squeeze 20  reps  Exercise 7: Sidelying abduction 4#  20 reps  Exercise 8: Bent knee fall out 20 reps with green band  Exercise 9: Standing ipsi/contralateral hip abduction and extension 20 reps with green  band  Exercise 11: Monster Walks 20\" x 6  (3-laps)  Exercise 12: 8 inch step up and down forward and laterally ,  Exercise 13: Apollo leg extension and flexion 50# 20 reps  Exercise 16: Apollo Leg Press 50# 2 x 10  Exercise 17: Sit-stand x 10 with 6# ball  Exercise 18:  Mini Squats x 10  Exercise 19: Silver  theraband for plantar flexion (hold 30 seconds or greater to simulate pushing on pedal in bulldozer) x 5          Strength: [] NT  [x] MMT completed:     Strength LLE  L Hip Flexion: 5/5  L Hip Extension: 4+/5  L Hip ADduction: 4+/5  L Knee Flexion: 5/5  L Knee Extension: 5/5             ROM: [] NT  [x] ROM measurements:        PROM LLE (degrees)  LLE General PROM: Knee flexion 0-110        Modalities:  Modalities  Cryotherapy (Minutes\Location): Ice to left knee after exercise for pain control     *Indicates exercise, modality, or manual techniques to be initiated when appropriate    Assessment:   Activity Tolerance  Activity Tolerance Comments: Patient was able to increase cuff weights today for strengthening    Body Structures, Functions, Activity Limitations Requiring Skilled Therapeutic Intervention: Decreased ROM,Decreased strength,Decreased endurance  Assessment: Patient is progressing in his program and able to increase his weights. His strength of the left knee is also improving.   He requires continued treatment to prepare him for return to work  Treatment Diagnosis: Painful left knee with decreased strength and range of motion  Therapy Prognosis: Excellent  Patient Education: Given written instructions and instructed in quad sets, active range of motion, and hamstring stretching, walking    Goals:  Short Term Goals  Time Frame for Short term goals: 3-6 treatments  Short term goal 1: Active flexion of the left knee to 116 degrees  Short term goal 2: Pain decreased to 3/10  (goal met 3/21/22)  Short term goal 3: Hamstring length:  SLR 0-60 left  (goal met 4/15/22)    Long Term Goals  Time Frame for Long term goals : 7-24  Long term goal 1: Active range of motion of the left knee within normal limits  Long term goal 2: Strength of the left hip and knee 5/5  Long term goal 3: Pain 0/10 to 1/10 while walking  Long term goal 4: Patient will be able to tolerate regular work duty which includes stepping up on a stair 18 inches high  Long term goal 5: LEFS will be scored at discharge will be higher than 56/80  Progress toward goals: good and on-going    POST-PAIN       Pain Rating (0-10 pain scale):  2 /10   Location and pain description same as pre-treatment unless indicated. Action: [] NA   [x] Perform HEP  [] Meds as prescribed  [] Modalities as prescribed   [] Call Physician     Frequency/Duration:  Plan  Plan: 2-3 times per week  Plan weeks: 6  Current Treatment Recommendations: Strengthening,Endurance training,Modalities,Return to work related activity  Plan Comment: Add uneven ground walking the next visit     Pt to continue current HEP. See objective section for any therapeutic exercise changes, additions or modifications this date.          PT Individual Minutes  Time In: 7646  Time Out: 1055  Minutes: 65  Timed Code Treatment Minutes: 55 Minutes  Procedure Minutes:10 minutes ice     Modality Time In Time Out Total Minutes Units    Ther ex (60450) 0950 1045 55 4   Manual Therapy ([id number])       Neuro re-ed ([id number])       Massage ([id number])       Estim unattended   ([id number])         Signature:  Electronically signed by Yazmin Smith PT on 4/20/22 at 10:52 AM EDT

## 2022-04-22 ENCOUNTER — HOSPITAL ENCOUNTER (OUTPATIENT)
Dept: PHYSICAL THERAPY | Age: 47
Setting detail: THERAPIES SERIES
Discharge: HOME | End: 2022-04-22
Payer: COMMERCIAL

## 2022-04-22 PROCEDURE — 97110 THERAPEUTIC EXERCISES: CPT

## 2022-04-22 ASSESSMENT — PAIN DESCRIPTION - ORIENTATION: ORIENTATION: LEFT

## 2022-04-22 ASSESSMENT — PAIN DESCRIPTION - PAIN TYPE: TYPE: SURGICAL PAIN

## 2022-04-22 ASSESSMENT — PAIN DESCRIPTION - DESCRIPTORS: DESCRIPTORS: ACHING

## 2022-04-22 ASSESSMENT — PAIN SCALES - GENERAL: PAINLEVEL_OUTOF10: 3

## 2022-04-22 ASSESSMENT — PAIN DESCRIPTION - LOCATION: LOCATION: KNEE

## 2022-04-22 NOTE — PROGRESS NOTES
[facility]  Outpatient Physical Therapy    Treatment Note        Date: 2022  Patient: Allie Cheadle  : 1975   Confirmed: Yes  MRN: [id number]  Referring Provider: Rimma Rios MD   Secondary Referring Provider:     Medical Diagnosis: Sprain of other specified parts of left knee, sequela [S83.8X2S]  Other tear of lateral meniscus, current injury, left knee, sequela [S83.282S]    Treatment Diagnosis: Painful left knee with decreased strength and range of motion    Visit Information:  Insurance: Payor: Rajinder Rosario / Plan: [employer] / Product Type: *No Product type* /   PT Visit Information  Onset Date: 22  Total # of Visits Approved: 24  Total # of Visits to Date:   No Show: 0  Canceled Appointment: 0  Progress Note Counter:     Subjective Information:  Subjective: It's worse when I sit and then go to get up. HEP Compliance:  [x] Good [] Fair [] Poor [] Reports not doing due to:    Pain Screening  Patient Currently in Pain: Yes  Pain Level: 3  Pain Type: Surgical pain  Pain Location: Knee  Pain Orientation: Left  Pain Descriptors: Aching    Objective Measures:        Strength: [x] NT  [] MMT completed:        ROM: [x] NT  [] ROM measurements:        Treatment:  Exercises:  Exercises  Exercise 1: BOSU Lunges Fwd with limited ROM, x1 UE support on Rail, VCs to control depth and lateral movement of Left Knee. Exercise 4: bike level 6 for 6 mnute  Exercise 5: Outdoor walking over gravel, up/down hills in yard  Exercise 9: Standing ipsi/contralateral hip abduction and extension 20 reps with green  band  Exercise 10: Hamstring string stretch on step 3 each leg 30 second hold  Exercise 11: Monster Walks 20\" x 6  (3-laps)  Exercise 12: 8 inch step up and down forward and laterally ,  Exercise 13: Apollo leg extension and flexion 50# 20 reps  Exercise 15: SLS on AirEx foam  20 sec.  x 3  Exercise 16: Apollo Leg Press 50# 2 x 10  Exercise 17: Sit-stand  2 x 10 with 6# ball  Exercise 18: Mini Squats 2 x 10  Exercise 19: Silver  theraband for plantar flexion (hold 30 seconds or greater to simulate pushing on pedal in bulldozer) x 5  Exercise 20: HEP: Sit-stands, Squats, T-band Hip Ext, ABD    ndicates exercise, modality, or manual techniques to be initiated when appropriate    Assessment: Body Structures, Functions, Activity Limitations Requiring Skilled Therapeutic Intervention: Decreased ROM,Decreased strength,Decreased endurance  Assessment: Mild discomfort reported with walking outdoor on uneven surface through yard, up/down hills and through gravel. Review proper body mechanics with squats as pt displayed quad dominate squats that cause increased knee pain, worked on progression of glute activated squats with decreased pain with lower squats. Treatment Diagnosis: Painful left knee with decreased strength and range of motion  Therapy Prognosis: Excellent          Post-Pain Assessment:       Pain Rating (0-10 pain scale):   3/10   Location and pain description same as pre-treatment unless indicated. Action: [] NA   [x] Perform HEP  [] Meds as prescribed  [] Modalities as prescribed   [] Call Physician     GOALS   Patient Goal(s): Patient goals : To walk normal  Short Term Goals Completed by 3-6 treatments Goal Status   STG 1. Active flexion of the left knee to 116 degrees Partially met,In progress   STG 2. Pain decreased to 3/10  (goal met 3/21/22) Met   STG 3. Hamstring length:  SLR 0-60 left  (goal met 4/15/22) Met   STG 4. STG 5. Long Term Goals Completed by 7-24 Goal Status   LTG 1. Active range of motion of the left knee within normal limits Partially met,In progress   LTG 2. Strength of the left hip and knee 5/5 Partially met,In progress   LTG 3. Pain 0/10 to 1/10 while walking Partially met,In progress   LTG 4.  Patient will be able to tolerate regular work duty which includes stepping up on a stair 18 inches high Not Met,In progress   LTG 5. LEFS will be scored at discharge will be higher than 56/80 Not Met,In progress   LTG 6. Plan:  Frequency/Duration:  Plan  Plan Frequency: 2  Plan weeks: 6  Current Treatment Recommendations: Strengthening,Endurance training,Modalities,Return to work related activity  Pt to continue current HEP. See objective section for any therapeutic exercise changes, additions or modifications this date.     Therapy Time:      PT Individual Minutes  Time In: 0967  Time Out: 4235  Minutes: 54  Timed Code Treatment Minutes: 54 Minutes  Procedure Minutes:0       Modality Time In Time Out Total Minutes Units    Ther ex ([id number]) [id number] 18 4     Electronically signed by Parrish Lobo PTA on 4/22/22 at 8:54 AM EDT

## 2022-04-25 ENCOUNTER — HOSPITAL ENCOUNTER (OUTPATIENT)
Dept: PHYSICAL THERAPY | Age: 47
Setting detail: THERAPIES SERIES
Discharge: HOME | End: 2022-04-25
Payer: COMMERCIAL

## 2022-04-25 PROCEDURE — 97110 THERAPEUTIC EXERCISES: CPT

## 2022-04-25 ASSESSMENT — PAIN DESCRIPTION - LOCATION: LOCATION: KNEE

## 2022-04-25 ASSESSMENT — PAIN DESCRIPTION - DESCRIPTORS: DESCRIPTORS: ACHING

## 2022-04-25 ASSESSMENT — PAIN DESCRIPTION - PAIN TYPE: TYPE: SURGICAL PAIN

## 2022-04-25 ASSESSMENT — PAIN DESCRIPTION - ORIENTATION: ORIENTATION: LEFT

## 2022-04-25 ASSESSMENT — PAIN SCALES - GENERAL: PAINLEVEL_OUTOF10: 3

## 2022-04-25 NOTE — PROGRESS NOTES
[facility]  Outpatient Physical Therapy    Treatment Note        Date: 2022  Patient: Morgan Franco  : 1975   Confirmed: Yes  MRN: [id number]  Referring Provider: Ramiro Juarez MD   Secondary Referring Provider:     Medical Diagnosis: Sprain of other specified parts of left knee, sequela [S83.8X2S]  Other tear of lateral meniscus, current injury, left knee, sequela [S83.282S]    Treatment Diagnosis: Painful left knee with decreased strength and range of motion    Visit Information:  Insurance: Payor: Leti Pages / Plan: [employer] / Product Type: *No Product type* /   PT Visit Information  Total # of Visits Approved: 24  Total # of Visits to Date:   Canceled Appointment: 0  Progress Note Counter:     Subjective Information:  Subjective: It's sore today, I was up and on it most of the day yesterday, it's just a little sore trying to get going today. HEP Compliance:  [x] Good [] Fair [] Poor [] Reports not doing due to:    Pain Screening  Patient Currently in Pain: Yes  Pain Assessment: 0-10  Pain Level: 3 (Within the last 7 days)  Best Pain Level: 1  Worst Pain Level: 6 (Within the last 7 days)  Pain Type: Surgical pain  Pain Location: Knee  Pain Orientation: Left  Pain Descriptors: Aching    Objective Measures:             Strength: [x] NT  [] MMT completed:     ROM: [] NT  [x] ROM measurements:     AROM LLE (degrees)  L Knee Flexion 0-145: 113  L Knee Extension 0: -15         Treatment:  Exercises:  Exercises  Exercise 4: bike level 6 for 6 mnute  Exercise 9: Standing ipsi/contralateral hip abduction and extension 20 reps with green  band  Exercise 10: Hamstring string stretch on step 3 each leg 30 second hold  Exercise 11:  Monster Walks 20\" x 6  (3-laps)  Exercise 12: 12 inch step up and down forward and 8 inch  laterally moving right to left  Exercise 13: Apollo leg extension and flexion 50# 20 reps  Exercise 15: SLS on AirEx foam  20 sec. x 3  Exercise 16: Apollo Leg Press 50# 2 x 10  Exercise 17: Sit-stand   x 10 with 10# hand weight  Exercise 18: Mini Squats 2 x 10      Assessment: Body Structures, Functions, Activity Limitations Requiring Skilled Therapeutic Intervention: Decreased ROM,Decreased strength,Decreased endurance  Assessment: Pt's knee Ext remains limited, while knee flexion stays consistant between 112-115 degrees of flexion. Increased pain with increased step height, pt unable to complete step ups greater than 12 inch at this time due to increased knee pain. Treatment Diagnosis: Painful left knee with decreased strength and range of motion             Post-Pain Assessment:       Pain Rating (0-10 pain scale):  3 /10   Location and pain description same as pre-treatment unless indicated. Action: [] NA   [x] Perform HEP  [] Meds as prescribed  [] Modalities as prescribed   [] Call Physician     GOALS   Patient Goal(s): Patient goals : To walk normal  Short Term Goals Completed by 3-6 treatments Goal Status   STG 1. Active flexion of the left knee to 116 degrees Partially met,In progress   STG 2. Pain decreased to 3/10  (goal met 3/21/22) Met   STG 3. Hamstring length:  SLR 0-60 left  (goal met 4/15/22) Met   STG 4. STG 5. Long Term Goals Completed by 7-24 Goal Status   LTG 1. Active range of motion of the left knee within normal limits Partially met   LTG 2. Strength of the left hip and knee 5/5 Partially met   LTG 3. Pain 0/10 to 1/10 while walking Partially met   LTG 4. Patient will be able to tolerate regular work duty which includes stepping up on a stair 18 inches high Not Met,In progress   LTG 5. LEFS will be scored at discharge will be higher than 56/80     LTG 6. Plan:  Frequency/Duration:  Plan  Plan Frequency: 2  Plan weeks: 6  Current Treatment Recommendations: Strengthening,Endurance training,Modalities,Return to work related activity  Pt to continue current HEP. See objective section for any therapeutic exercise changes, additions or modifications this date.     Therapy Time:      PT Individual Minutes  Time In: 1455  Time Out: 0840  Minutes: 46  Timed Code Treatment Minutes: 46 Minutes  Procedure Minutes:0       Modality Time In Time Out Total Minutes Units    Ther ex ([id number]) [id number] 0077 22 3     Electronically signed by Anahy James PTA on 4/25/22 at 9:14 AM EDT

## 2022-04-27 ENCOUNTER — HOSPITAL ENCOUNTER (OUTPATIENT)
Dept: PHYSICAL THERAPY | Age: 47
Setting detail: THERAPIES SERIES
Discharge: HOME | End: 2022-04-27
Payer: COMMERCIAL

## 2022-04-27 PROCEDURE — 97110 THERAPEUTIC EXERCISES: CPT

## 2022-04-27 ASSESSMENT — PAIN DESCRIPTION - DESCRIPTORS: DESCRIPTORS: ACHING

## 2022-04-27 ASSESSMENT — PAIN DESCRIPTION - ORIENTATION: ORIENTATION: LEFT

## 2022-04-27 ASSESSMENT — PAIN DESCRIPTION - LOCATION: LOCATION: KNEE

## 2022-04-27 ASSESSMENT — PAIN DESCRIPTION - PAIN TYPE: TYPE: SURGICAL PAIN

## 2022-04-27 ASSESSMENT — PAIN SCALES - GENERAL: PAINLEVEL_OUTOF10: 3

## 2022-04-27 NOTE — PROGRESS NOTES
[facility]  Outpatient Physical Therapy    Treatment Note        Date: 2022  Patient: Josh Baires  : 1975   Confirmed: Yes  MRN: [id number]  Referring Provider: Victoriano Barajas MD   Secondary Referring Provider:     Medical Diagnosis: Sprain of other specified parts of left knee, sequela [S83.8X2S]  Other tear of lateral meniscus, current injury, left knee, sequela [S83.282S]    Treatment Diagnosis: Painful left knee with decreased strength and range of motion    Visit Information:  Insurance: Payor: [employer] / Plan: Ramirez Oseguera / Product Type: *No Product type* /   PT Visit Information  Onset Date: 22  Total # of Visits Approved: 24  Total # of Visits to Date:   No Show: 0  Canceled Appointment: 0  Progress Note Counter:     Subjective Information:  Subjective: I don't think this knee is ready, with the walking on uneven ground that I will have to do. HEP Compliance:  [x] Good [] Fair [] Poor [] Reports not doing due to:    Pain Screening  Patient Currently in Pain: Yes  Pain Assessment: 0-10  Pain Level: 3  Pain Type: Surgical pain  Pain Location: Knee  Pain Orientation: Left  Pain Descriptors: Aching    Objective Measures:      Strength: [x] NT  [] MMT completed:        ROM: [x] NT  [] ROM measurements:        Treatment:  Exercises:  Exercises  Exercise 4: bike level 6 for 8 mnute  Exercise 9: Standing ipsi/contralateral hip abduction and extension 20 reps with green  band  Exercise 10: Hamstring stretch Seated with leg on 8 inch box  3 each leg 30 second hold  Exercise 11: Trevenater Walks 20\" x 6  (3-laps)  Exercise 12: 12 inch step up and down forward and 8 inch  laterally moving right to left  Exercise 13: Apollo leg extension and flexion 50# 20 reps  Exercise 15: SLS on AirEx foam  20 sec. x 3  Exercise 16: Apollo Leg Press 50# 2 x 10  Exercise 17: Sit-stand   x 10 with 10# hand weight  Exercise 18:  Mini Squats against wall with use of P-ball  2 x 10      Assessment: Body Structures, Functions, Activity Limitations Requiring Skilled Therapeutic Intervention: Decreased ROM,Decreased strength,Decreased endurance  Assessment: Pain levels remain in 2-3/10 range throughout tx session. Improved techniques with step ups of 12 inch with decreased compenstation noted compared to previous sessions. Pt to retrun to MD tomorrow then back to therapy on Friday 4/28/22. Pt has x1 additional week of therapy currently scheduled with focus on continued strength and endurance activities to prepare pt for Retrun to Work Duties. Treatment Diagnosis: Painful left knee with decreased strength and range of motion  Therapy Prognosis: Excellent        Patient Education:       Post-Pain Assessment:       Pain Rating (0-10 pain scale):   3/10   Location and pain description same as pre-treatment unless indicated. Action: [] NA   [x] Perform HEP  [] Meds as prescribed  [] Modalities as prescribed   [] Call Physician     GOALS   Patient Goal(s): Patient goals : To walk normal  Short Term Goals Completed by 3-6 treatments Goal Status   STG 1. Active flexion of the left knee to 116 degrees In progress   STG 2. Pain decreased to 3/10  (goal met 3/21/22) Met   STG 3. Hamstring length:  SLR 0-60 left  (goal met 4/15/22) Met   STG 4. STG 5. Long Term Goals Completed by 7-24 Goal Status   LTG 1. Active range of motion of the left knee within normal limits In progress   LTG 2. Strength of the left hip and knee 5/5 In progress   LTG 3. Pain 0/10 to 1/10 while walking In progress   LTG 4. Patient will be able to tolerate regular work duty which includes stepping up on a stair 18 inches high In progress   LTG 5. LEFS will be scored at discharge will be higher than 56/80 In progress   LTG 6.              Plan:  Frequency/Duration:  Plan  Plan Frequency: 2  Plan weeks: 6  Current Treatment Recommendations: Strengthening,Endurance training,Modalities,Return to work related activity  Pt to continue current HEP. See objective section for any therapeutic exercise changes, additions or modifications this date.     Therapy Time:      PT Individual Minutes  Time In: 7948  Time Out: 3496  Minutes: 45  Timed Code Treatment Minutes: 45 Minutes  Procedure Minutes:0       Modality Time In Time Out Total Minutes Units    Ther ex ([id number]) [id number] 24 3     Electronically signed by Anahy James PTA on 4/27/22 at 8:52 AM EDT

## 2022-04-28 ENCOUNTER — OFFICE VISIT (OUTPATIENT)
Dept: ORTHOPEDIC SURGERY | Age: 47
End: 2022-04-28

## 2022-04-28 VITALS
BODY MASS INDEX: 42.66 KG/M2 | HEIGHT: 72 IN | WEIGHT: 315 LBS | HEART RATE: 81 BPM | OXYGEN SATURATION: 97 % | TEMPERATURE: 97.4 F

## 2022-04-28 DIAGNOSIS — M23.204 OLD TEAR OF MEDIAL MENISCUS OF LEFT KNEE, UNSPECIFIED TEAR TYPE: Primary | ICD-10-CM

## 2022-04-28 PROCEDURE — 99024 POSTOP FOLLOW-UP VISIT: CPT | Performed by: ORTHOPAEDIC SURGERY

## 2022-04-28 NOTE — PROGRESS NOTES
Narrative Referring Provider:   Vanessa Brantley  [id number] [facility] [id number]    PCP:   MAHI Heaton CNP    ============================  IMPRESSION/PLAN:  ============================  Savita Prophet is s/p left Partial medial and partial lateral meniscectomy, arthroscopic completed on/7/22. IMPRESSION: Patient had osteoarthritis in the knee and the patellofemoral and medial articulations. He continues to have aching, but the mechanical symptoms are gone. I offered him an intra-articular steroid injection and he has a great fear of needles and declined. PLAN:  Continue with anti-inflammatories. I did tell him that if he wanted to get an injection, I can get him with Xanax and he Somebody drive him in for the appointment. He still declined. Routine follow-up. Ice compression and elevation  Patient Reassurance: Normal post-operative course discussed with patient. Patient reassured and supported. All questions answered. Follow up as needed     patient presents today for a a routine post-op visit      Status post op: Arthroscopy    BMI: There is no height or weight on file to calculate BMI. Post-operative recovery was complicated by uneventful/none. Patient rates their condition as improving. Does the patient still experience pain? 4/10 pain, aching: The mechanical symptoms are gone    Post Op discharge patient location: in home. Functional Assessment is as follows: More therapy session. Functional difficulties: None. Pain Medication: Anti-inflammatories  Currently Ambulating with: No assistive devices    =================================  EXAM: POST OP KNEE  =================================  Left Post-Operative Knee    Ambulates with a limp favoring the left. SKIN: Appropriate postop appearance, No evidence of erythema, warmth, discharge or drainage and Incision clean/dry/intact.     Range of motion is 0 degrees in extension and    115 degrees of flexion. Extension La degrees    Pain with ROM: Yes with deeper flexion    There is Mild effusion.      Mal-alignment: No    Tender to the palpation of Medial femoral condyle     Neurovascular Status: Sensation Intact, Moves foot and ankle up & down, 2+ dorsalis pedis and negative homans sign    Stability:Varus/Valgus- Yes, stable    Quad strength: improving    Imaging:   None today    Provider:   Mala Pedroza MD

## 2022-04-28 NOTE — LETTER
[address] [facility]  [address]  Phone: [phone]  Fax: [phone]    Reg Alexander MD        April 28, 2022     Patient: Marli Mccrary   YOB: 1975   Date of Visit: 4/28/2022       To Whom It May Concern: It is my medical opinion that Wilber Almazan may return to work duty on 5/2/22 with no work restrictions. If you have any questions or concerns, please don't hesitate to call.     Sincerely,        Reg Alexander MD

## 2022-04-29 ENCOUNTER — HOSPITAL ENCOUNTER (OUTPATIENT)
Dept: PHYSICAL THERAPY | Age: 47
Setting detail: THERAPIES SERIES
Discharge: HOME | End: 2022-04-29
Payer: COMMERCIAL

## 2022-04-29 PROCEDURE — 97164 PT RE-EVAL EST PLAN CARE: CPT | Performed by: PHYSICAL THERAPIST

## 2022-04-29 PROCEDURE — 97110 THERAPEUTIC EXERCISES: CPT | Performed by: PHYSICAL THERAPIST

## 2022-04-29 ASSESSMENT — PAIN DESCRIPTION - ORIENTATION: ORIENTATION: LEFT

## 2022-04-29 ASSESSMENT — PAIN DESCRIPTION - LOCATION: LOCATION: KNEE

## 2022-04-29 ASSESSMENT — PAIN - FUNCTIONAL ASSESSMENT: PAIN_FUNCTIONAL_ASSESSMENT: ACTIVITIES ARE NOT PREVENTED

## 2022-04-29 ASSESSMENT — PAIN DESCRIPTION - PAIN TYPE: TYPE: SURGICAL PAIN

## 2022-04-29 ASSESSMENT — PAIN SCALES - GENERAL: PAINLEVEL_OUTOF10: 2

## 2022-04-29 NOTE — PROGRESS NOTES
[address]  PHYSICAL THERAPY PLAN OF CARE   [address]   [place], [address] [facility]         Ph: [phone]  Fax: [phone]    [] Certification  [] Recertification []  Plan of Care  [] Progress Note [x] Discharge      Referring Provider: Liz Crespo MD      From:  Carolyne Kim, PT   Patient: Marli Mccrary (13 y.o. male) : 1975 Date: 2022   Medical Diagnosis: Sprain of other specified parts of left knee, sequela [S83.8X2S]  Other tear of lateral meniscus, current injury, left knee, sequela [S83.282S]    Treatment Diagnosis: Painful left knee with decreased strength and range of motion    Plan of Care/Certification Expiration Date:     Progress Report Period from: 3/8/2022   to 2022    Visits to Date: 21 No Show: 0 Cancelled Appts: 0    OBJECTIVE:   Short Term Goals - Time Frame for Short term goals: 3-6 treatments    Goals Current/Discharge status  Status   Short term goal 1: Active flexion of the left knee to 116 degrees  Active flexion 113 STG Goal 1 Status[de-identified] Partially met   Short term goal 2: Pain decreased to 3/10  (goal met 3/21/22)  Pain 1/10  STG Goal 2 Status[de-identified] Met   Short term goal 3: Hamstring length:  SLR 0-60 left  (goal met 4/15/22)  SLR 0-60 STG Goal 3 Status[de-identified] Met                    Long Term Goals - Time Frame for Long term goals : 7-24  Goals Current/ Discharge status Status   Long term goal 1: Active range of motion of the left knee within normal limits Active range of motion of the left knee within normal limits LTG Goal 1 Status[de-identified] Met   Long term goal 2: Strength of the left hip and knee 5/5 Quads and hamstrings 5/5 LTG Goal 2 Status[de-identified] Met   Long term goal 3: Pain 0/10 to 1/10 while walking Pain generally is 1/10 with walking LTG Goal 3 Status[de-identified] Met   Long term goal 4: Patient will be able to tolerate regular work duty which includes stepping up on a stair 18 inches high Patient is going back to regular work duty on May 2, 2022 LTG Goal 4 Status[de-identified] Met    LEFS greater than 56/80 He scored 46/80 has some trouble with deep squat. LTG Goal 5 Status[de-identified] Not Met                     Assessment: Patient has been cleared by his ortho to return to work on Monday May 2, 2022. 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 He will be discharged from physical therapy He is encouraged to continue his home exercise program.  Therapy Prognosis: Excellent      PT Education: Home Exercise Program    PLAN: [] Evaluate and Treat  Frequency/Duration:  Plan Comment: Discharge from program and return to work. Goals have been met   Except LEFS score. Patient is returning to work. Patient Status:[] Continue/ Initiate plan of Care    [x] Discharge PT. Recommend pt continue with HEP. [] Additional visits requested, Please re-certify for additional visits:        Signature: Electronically signed by Nick Mcdaniel PT on 4/29/22 at 9:44 AM EDT      If you have any questions or concerns, please don't hesitate to call. Thank you for your referral.    I have reviewed this plan of care and certify a need for medically necessary rehabilitation services.     Physician Signature:__________________________________________________________  Date:  Please sign and return

## 2022-04-29 NOTE — PROGRESS NOTES
[facility]  Outpatient Physical Therapy    Treatment Note        Date: 2022  Patient: Peter Baker  : 1975   Confirmed: Yes  MRN: [id number]  Referring Provider: Joselyn Babb MD   Secondary Referring Provider:     Medical Diagnosis: Sprain of other specified parts of left knee, sequela [S83.8X2S]  Other tear of lateral meniscus, current injury, left knee, sequela [S83.282S]    Treatment Diagnosis: Painful left knee with decreased strength and range of motion    Visit Information:  Insurance: Payor: Ilya Tamayo / Plan: Wilbern Favre / Product Type: *No Product type* /   PT Visit Information  PT Insurance Information: Juni [id number]  Total # of Visits Approved: 24  Total # of Visits to Date:   No Show: 0  Canceled Appointment: 0  Progress Note Counter:     Subjective Information:  Subjective: Patient states that he saw the ortho and is released back to regular duty.   HEP Compliance:  [x] Good [] Fair [] Poor [] Reports not doing due to:    Pain Screening  Patient Currently in Pain: Yes  Pain Assessment: 0-10  Pain Level: 2  Best Pain Level: 1  Worst Pain Level: 6  Pain Type: Surgical pain  Pain Location: Knee  Pain Orientation: Left  Functional Pain Assessment: Activities are not prevented    Objective Measures:      Strength: [] NT  [x] MMT completed:     Strength LLE  L Hip Flexion: 5/5  L Hip Extension: 5/5  L Hip ABduction: 5/5  L Hip ADduction: 5/5  L Knee Flexion: 5/5  L Knee Extension: 5/5             ROM: [] NT  [x] ROM measurements:     AROM LLE (degrees)  L Knee Flexion 0-145: 113  L Knee Extension 0: -15          Treatment:  Exercises:  Exercises  Exercise 4: bike level 6 for 8 mnute  Exercise 13: Apollo leg extension and flexion 50# 20 reps  Exercise 16: Apollo Leg Press 50# 2 x 10    *Indicates exercise, modality, or manual techniques to be initiated when appropriate    Assessment:      Assessment: Patient has been cleared by his ortho to return to work on Monday May 2, 2022. 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 He will be discharged from physical therapy He is encouraged to continue his home exercise program.  Treatment Diagnosis: Painful left knee with decreased strength and range of motion  Therapy Prognosis: Excellent  PT Education: Home Exercise Program     Patient Education: Home Exercise Program     Post-Pain Assessment:       Pain Rating (0-10 pain scale):  1 /10   Location and pain description same as pre-treatment unless indicated. Action: [] NA   [x] Perform HEP  [] Meds as prescribed  [] Modalities as prescribed   [] Call Physician     GOALS   Patient Goal(s): Patient goals : To walk normal  Short Term Goals Completed by 3-6 treatments Goal Status   STG 1. Active flexion of the left knee to 116 degrees Partially met   STG 2. Pain decreased to 3/10  (goal met 3/21/22) Met   STG 3. Hamstring length:  SLR 0-60 left  (goal met 4/15/22) Met   STG 4. STG 5. Long Term Goals Completed by 7-24 Goal Status   LTG 1. Active range of motion of the left knee within normal limits Met   LTG 2. Strength of the left hip and knee 5/5 Met   LTG 3. Pain 0/10 to 1/10 while walking Met   LTG 4. Patient will be able to tolerate regular work duty which includes stepping up on a stair 18 inches high Met   LTG 5. Not Met   LTG 6. Plan:  Frequency/Duration:  Plan  Plan Comment: Discharge from program and return to work. Pt to continue current HEP. See objective section for any therapeutic exercise changes, additions or modifications this date.     Therapy Time:      PT Individual Minutes  Time In: 0800  Time Out: 0830  Minutes: 30  Timed Code Treatment Minutes: 15 Minutes  Procedure Minutes: 15 minutes re-eval       Modality Time In Time Out Total Minutes Units    Ther ex ([id number]) 0800 0815 15 1   Manual Therapy ([id number])       Neuro re-ed ([id number]) Massage ([id number])       Estim unattended   ([id number])           Electronically signed by Geovanni Finney PT on 4/29/22 at 9:37 AM EDT

## 2023-09-25 ENCOUNTER — OFFICE VISIT (OUTPATIENT)
Dept: FAMILY MEDICINE CLINIC | Age: 48
End: 2023-09-25
Payer: COMMERCIAL

## 2023-09-25 VITALS
OXYGEN SATURATION: 97 % | WEIGHT: 315 LBS | HEIGHT: 72 IN | SYSTOLIC BLOOD PRESSURE: 128 MMHG | DIASTOLIC BLOOD PRESSURE: 80 MMHG | TEMPERATURE: 98.7 F | HEART RATE: 79 BPM | BODY MASS INDEX: 42.66 KG/M2

## 2023-09-25 DIAGNOSIS — H65.199 ACUTE NONSUPPURATIVE OTITIS MEDIA: Primary | ICD-10-CM

## 2023-09-25 DIAGNOSIS — J01.90 ACUTE NON-RECURRENT SINUSITIS, UNSPECIFIED LOCATION: ICD-10-CM

## 2023-09-25 PROCEDURE — G8427 DOCREV CUR MEDS BY ELIG CLIN: HCPCS

## 2023-09-25 PROCEDURE — G8417 CALC BMI ABV UP PARAM F/U: HCPCS

## 2023-09-25 PROCEDURE — 4004F PT TOBACCO SCREEN RCVD TLK: CPT

## 2023-09-25 PROCEDURE — 99213 OFFICE O/P EST LOW 20 MIN: CPT

## 2023-09-25 RX ORDER — GUAIFENESIN 600 MG/1
1200 TABLET, EXTENDED RELEASE ORAL 2 TIMES DAILY
Qty: 40 TABLET | Refills: 0 | Status: SHIPPED | OUTPATIENT
Start: 2023-09-25 | End: 2023-10-05

## 2023-09-25 RX ORDER — AMOXICILLIN AND CLAVULANATE POTASSIUM 875; 125 MG/1; MG/1
1 TABLET, FILM COATED ORAL 2 TIMES DAILY
Qty: 20 TABLET | Refills: 0 | Status: SHIPPED | OUTPATIENT
Start: 2023-09-25 | End: 2023-10-05

## 2023-09-25 SDOH — ECONOMIC STABILITY: HOUSING INSECURITY
IN THE LAST 12 MONTHS, WAS THERE A TIME WHEN YOU DID NOT HAVE A STEADY PLACE TO SLEEP OR SLEPT IN A SHELTER (INCLUDING NOW)?: NO

## 2023-09-25 SDOH — ECONOMIC STABILITY: FOOD INSECURITY: WITHIN THE PAST 12 MONTHS, THE FOOD YOU BOUGHT JUST DIDN'T LAST AND YOU DIDN'T HAVE MONEY TO GET MORE.: NEVER TRUE

## 2023-09-25 SDOH — ECONOMIC STABILITY: INCOME INSECURITY: HOW HARD IS IT FOR YOU TO PAY FOR THE VERY BASICS LIKE FOOD, HOUSING, MEDICAL CARE, AND HEATING?: NOT HARD AT ALL

## 2023-09-25 SDOH — ECONOMIC STABILITY: FOOD INSECURITY: WITHIN THE PAST 12 MONTHS, YOU WORRIED THAT YOUR FOOD WOULD RUN OUT BEFORE YOU GOT MONEY TO BUY MORE.: NEVER TRUE

## 2023-09-25 ASSESSMENT — PATIENT HEALTH QUESTIONNAIRE - PHQ9
1. LITTLE INTEREST OR PLEASURE IN DOING THINGS: 0
6. FEELING BAD ABOUT YOURSELF - OR THAT YOU ARE A FAILURE OR HAVE LET YOURSELF OR YOUR FAMILY DOWN: 0
SUM OF ALL RESPONSES TO PHQ9 QUESTIONS 1 & 2: 0
SUM OF ALL RESPONSES TO PHQ QUESTIONS 1-9: 0
10. IF YOU CHECKED OFF ANY PROBLEMS, HOW DIFFICULT HAVE THESE PROBLEMS MADE IT FOR YOU TO DO YOUR WORK, TAKE CARE OF THINGS AT HOME, OR GET ALONG WITH OTHER PEOPLE: 0
SUM OF ALL RESPONSES TO PHQ QUESTIONS 1-9: 0
3. TROUBLE FALLING OR STAYING ASLEEP: 0
SUM OF ALL RESPONSES TO PHQ QUESTIONS 1-9: 0
8. MOVING OR SPEAKING SO SLOWLY THAT OTHER PEOPLE COULD HAVE NOTICED. OR THE OPPOSITE, BEING SO FIGETY OR RESTLESS THAT YOU HAVE BEEN MOVING AROUND A LOT MORE THAN USUAL: 0
4. FEELING TIRED OR HAVING LITTLE ENERGY: 0
9. THOUGHTS THAT YOU WOULD BE BETTER OFF DEAD, OR OF HURTING YOURSELF: 0
2. FEELING DOWN, DEPRESSED OR HOPELESS: 0
7. TROUBLE CONCENTRATING ON THINGS, SUCH AS READING THE NEWSPAPER OR WATCHING TELEVISION: 0
SUM OF ALL RESPONSES TO PHQ QUESTIONS 1-9: 0
5. POOR APPETITE OR OVEREATING: 0

## 2023-09-25 ASSESSMENT — COLUMBIA-SUICIDE SEVERITY RATING SCALE - C-SSRS
2. HAVE YOU ACTUALLY HAD ANY THOUGHTS OF KILLING YOURSELF?: NO
1. WITHIN THE PAST MONTH, HAVE YOU WISHED YOU WERE DEAD OR WISHED YOU COULD GO TO SLEEP AND NOT WAKE UP?: NO
6. HAVE YOU EVER DONE ANYTHING, STARTED TO DO ANYTHING, OR PREPARED TO DO ANYTHING TO END YOUR LIFE?: NO

## 2023-09-25 ASSESSMENT — ENCOUNTER SYMPTOMS
RHINORRHEA: 0
SORE THROAT: 0
ABDOMINAL PAIN: 0
COUGH: 1
EYES NEGATIVE: 1
SINUS PRESSURE: 1
WHEEZING: 0
SHORTNESS OF BREATH: 1

## 2023-09-25 NOTE — PROGRESS NOTES
[address] [facility]          ASSESSMENT/PLAN     Shannan Rodriguez is a 51 y.o. male who presents with:  Report head congestion with dry cough starting 6 days ago. Symptoms have not improved with over-the-counter cold remedy. Mild shortness of breath denies fevers nausea. Significant findings on exam TM of right ear is erythemic pharynx is erythemic no tonsillar enlargement. Lung sounds clear. 1. Acute nonsuppurative otitis media  -     amoxicillin-clavulanate (AUGMENTIN) 875-125 MG per tablet; Take 1 tablet by mouth 2 times daily for 10 days, Disp-20 tablet, R-0Normal  2. Acute non-recurrent sinusitis, unspecified location  -     amoxicillin-clavulanate (AUGMENTIN) 875-125 MG per tablet; Take 1 tablet by mouth 2 times daily for 10 days, Disp-20 tablet, R-0Normal           PATIENT REFERRED TO:  Return if symptoms worsen or fail to improve. DISCHARGE MEDICATIONS:  New Prescriptions    AMOXICILLIN-CLAVULANATE (AUGMENTIN) 875-125 MG PER TABLET    Take 1 tablet by mouth 2 times daily for 10 days    GUAIFENESIN (MUCINEX) 600 MG EXTENDED RELEASE TABLET    Take 2 tablets by mouth 2 times daily for 10 days     Cannot display discharge medications since this is not an admission. Estrella Almaguer, MAHI - CNP    CHIEF COMPLAINT       Chief Complaint   Patient presents with    Cough     Head/chest congestion, dry cough x5-6 days          SUBJECTIVE/REVIEW OF SYSTEMS     Review of Systems   Constitutional:  Negative for chills, fatigue and fever. HENT:  Positive for congestion and sinus pressure. Negative for ear pain, rhinorrhea and sore throat. Eyes: Negative. Respiratory:  Positive for cough and shortness of breath. Negative for wheezing. Cardiovascular: Negative. Gastrointestinal:  Negative for abdominal pain. Endocrine: Negative. Musculoskeletal:  Negative for myalgias. Skin:  Negative for rash. Neurological:  Negative for weakness and light-headedness.    Hematological:

## (undated) DEVICE — HYPODERMIC SAFETY NEEDLE: Brand: MAGELLAN

## (undated) DEVICE — SUTURE MCRYL SZ 3-0 L27IN ABSRB UD L19MM PS-2 3/8 CIR PRIM Y427H

## (undated) DEVICE — Z DISCONTINUED USE 2272117 DRAPE SURG 3 QTR N INVASIVE 2 LAYR DISP

## (undated) DEVICE — CONNECTOR,T,STERILE: Brand: MEDLINE

## (undated) DEVICE — TUBING, SUCTION, 1/4" X 10', STRAIGHT: Brand: MEDLINE

## (undated) DEVICE — 4-PORT MANIFOLD: Brand: NEPTUNE 2

## (undated) DEVICE — PACK ARTHRO III ST SIRUS

## (undated) DEVICE — PADDING UNDERCAST W6INXL4YD RAYON POLY SYN NONADHESIVE

## (undated) DEVICE — APPLICATOR MEDICATED 26 CC SOLUTION HI LT ORNG CHLORAPREP

## (undated) DEVICE — PADDING UNDERCAST W4INXL12FT RAYON POLY SYN NONADHESIVE

## (undated) DEVICE — GOWN,AURORA,NONREINFORCED,LARGE: Brand: MEDLINE

## (undated) DEVICE — SYRINGE MED 10ML LUERLOCK TIP W/O SFTY DISP

## (undated) DEVICE — 3M™ STERI-DRAPE™ U-DRAPE 1015: Brand: STERI-DRAPE™

## (undated) DEVICE — SPONGE GZ W4XL4IN COT 12 PLY TYP VII WVN C FLD DSGN

## (undated) DEVICE — COUNTER NDL 40 COUNT HLD 70 FOAM BLK ADH W/ MAG

## (undated) DEVICE — SYRINGE MED 50ML LUERLOCK TIP

## (undated) DEVICE — GOWN,SIRUS,POLYRNF,BRTHSLV,XLN/XXL,18/CS: Brand: MEDLINE

## (undated) DEVICE — PAD,ABDOMINAL,8"X10",ST,LF: Brand: MEDLINE

## (undated) DEVICE — NEEDLE SPNL 20GA L3 1 2IN YEL S STL POLYCARB HUB MTL STYL

## (undated) DEVICE — PADDING CAST W6INXL4YD RAYON UNDERCAST SOF-ROL

## (undated) DEVICE — BANDAGE COBAN 4 IN COMPR W4INXL5YD FOAM COHESIVE QUIK STK SELF ADH SFT

## (undated) DEVICE — BANDAGE COMPR W6INXL5YD WHT BGE POLY COT M E WRP WV HK AND

## (undated) DEVICE — SYRINGE MED 30ML STD CLR PLAS LUERLOCK TIP N CTRL DISP

## (undated) DEVICE — LABEL MED MINI W/ MARKER

## (undated) DEVICE — [AGGRESSIVE PLUS CUTTER, ARTHROSCOPIC SHAVER BLADE,  DO NOT RESTERILIZE,  DO NOT USE IF PACKAGE IS DAMAGED,  KEEP DRY,  KEEP AWAY FROM SUNLIGHT]: Brand: FORMULA

## (undated) DEVICE — TUBING PMP L8FT LNG W/ CONN FOR AR-6400 REDEUCE

## (undated) DEVICE — MARKER SURG SKIN GENTIAN VLT REG TIP W/ 6IN RUL

## (undated) DEVICE — ABSORBENT ROLL ECODRI-SAFE

## (undated) DEVICE — GLOVE ORANGE PI 8 1/2   MSG9085

## (undated) DEVICE — 3M™ STERI-STRIP™ REINFORCED ADHESIVE SKIN CLOSURES, R1547, 1/2 IN X 4 IN (12 MM X 100 MM), 6 STRIPS/ENVELOPE: Brand: 3M™ STERI-STRIP™

## (undated) DEVICE — GLOVE SURG SZ 9 THK91MIL LTX FREE SYN POLYISOPRENE ANTI

## (undated) DEVICE — ADHESIVE SKIN CLSR 0.7ML TOP DERMBND ADV

## (undated) DEVICE — INTENDED FOR TISSUE SEPARATION, AND OTHER PROCEDURES THAT REQUIRE A SHARP SURGICAL BLADE TO PUNCTURE OR CUT.: Brand: BARD-PARKER ® CARBON RIB-BACK BLADES

## (undated) DEVICE — SPONGE,LAP,18"X18",DLX,XR,ST,5/PK,40/PK: Brand: MEDLINE